# Patient Record
Sex: FEMALE | Race: WHITE | Employment: UNEMPLOYED | URBAN - METROPOLITAN AREA
[De-identification: names, ages, dates, MRNs, and addresses within clinical notes are randomized per-mention and may not be internally consistent; named-entity substitution may affect disease eponyms.]

---

## 2017-12-11 ENCOUNTER — OFFICE VISIT (OUTPATIENT)
Dept: FAMILY MEDICINE CLINIC | Age: 42
End: 2017-12-11
Payer: COMMERCIAL

## 2017-12-11 VITALS
TEMPERATURE: 98.2 F | SYSTOLIC BLOOD PRESSURE: 130 MMHG | DIASTOLIC BLOOD PRESSURE: 89 MMHG | HEART RATE: 81 BPM | BODY MASS INDEX: 28.49 KG/M2 | HEIGHT: 65 IN | RESPIRATION RATE: 20 BRPM | WEIGHT: 171 LBS

## 2017-12-11 DIAGNOSIS — Z11.4 SCREENING FOR HIV (HUMAN IMMUNODEFICIENCY VIRUS): ICD-10-CM

## 2017-12-11 DIAGNOSIS — Z76.89 ENCOUNTER TO ESTABLISH CARE: ICD-10-CM

## 2017-12-11 DIAGNOSIS — K64.9 HEMORRHOIDS, UNSPECIFIED HEMORRHOID TYPE: ICD-10-CM

## 2017-12-11 DIAGNOSIS — Z23 NEED FOR DIPHTHERIA-TETANUS-PERTUSSIS (TDAP) VACCINE: ICD-10-CM

## 2017-12-11 DIAGNOSIS — E11.9 CONTROLLED TYPE 2 DIABETES MELLITUS WITHOUT COMPLICATION, WITHOUT LONG-TERM CURRENT USE OF INSULIN (HCC): Primary | ICD-10-CM

## 2017-12-11 PROCEDURE — 99204 OFFICE O/P NEW MOD 45 MIN: CPT | Performed by: NURSE PRACTITIONER

## 2017-12-11 PROCEDURE — 96127 BRIEF EMOTIONAL/BEHAV ASSMT: CPT | Performed by: NURSE PRACTITIONER

## 2017-12-11 PROCEDURE — 90471 IMMUNIZATION ADMIN: CPT | Performed by: NURSE PRACTITIONER

## 2017-12-11 PROCEDURE — 90715 TDAP VACCINE 7 YRS/> IM: CPT | Performed by: NURSE PRACTITIONER

## 2017-12-11 RX ORDER — CHOLECALCIFEROL (VITAMIN D3) 125 MCG
CAPSULE ORAL DAILY
COMMUNITY
End: 2022-06-22

## 2017-12-11 ASSESSMENT — PATIENT HEALTH QUESTIONNAIRE - PHQ9
SUM OF ALL RESPONSES TO PHQ QUESTIONS 1-9: 3
8. MOVING OR SPEAKING SO SLOWLY THAT OTHER PEOPLE COULD HAVE NOTICED. OR THE OPPOSITE, BEING SO FIGETY OR RESTLESS THAT YOU HAVE BEEN MOVING AROUND A LOT MORE THAN USUAL: 0
10. IF YOU CHECKED OFF ANY PROBLEMS, HOW DIFFICULT HAVE THESE PROBLEMS MADE IT FOR YOU TO DO YOUR WORK, TAKE CARE OF THINGS AT HOME, OR GET ALONG WITH OTHER PEOPLE: 0
1. LITTLE INTEREST OR PLEASURE IN DOING THINGS: 3
4. FEELING TIRED OR HAVING LITTLE ENERGY: 0
5. POOR APPETITE OR OVEREATING: 0
3. TROUBLE FALLING OR STAYING ASLEEP: 0
2. FEELING DOWN, DEPRESSED OR HOPELESS: 0
7. TROUBLE CONCENTRATING ON THINGS, SUCH AS READING THE NEWSPAPER OR WATCHING TELEVISION: 0
9. THOUGHTS THAT YOU WOULD BE BETTER OFF DEAD, OR OF HURTING YOURSELF: 0
6. FEELING BAD ABOUT YOURSELF - OR THAT YOU ARE A FAILURE OR HAVE LET YOURSELF OR YOUR FAMILY DOWN: 0
SUM OF ALL RESPONSES TO PHQ9 QUESTIONS 1 & 2: 3

## 2017-12-11 ASSESSMENT — ENCOUNTER SYMPTOMS
SHORTNESS OF BREATH: 0
TROUBLE SWALLOWING: 0
CONSTIPATION: 0
VOMITING: 0
SORE THROAT: 0
WHEEZING: 0
RHINORRHEA: 0
COUGH: 0
ABDOMINAL PAIN: 1
DIARRHEA: 0
EYE DISCHARGE: 0
EYE PAIN: 0
NAUSEA: 0

## 2018-03-26 LAB
ALBUMIN SERPL-MCNC: 4.3 G/DL
ALP BLD-CCNC: 62 U/L
ALT SERPL-CCNC: 12 U/L
ANION GAP SERPL CALCULATED.3IONS-SCNC: 15 MMOL/L
AST SERPL-CCNC: 14 U/L
AVERAGE GLUCOSE: 140
BASOPHILS ABSOLUTE: NORMAL /ΜL
BASOPHILS RELATIVE PERCENT: NORMAL %
BILIRUB SERPL-MCNC: 0.5 MG/DL (ref 0.1–1.4)
BUN BLDV-MCNC: 12 MG/DL
CALCIUM SERPL-MCNC: 9.4 MG/DL
CHLORIDE BLD-SCNC: 106 MMOL/L
CHOLESTEROL, TOTAL: 189 MG/DL
CHOLESTEROL/HDL RATIO: 3.94
CO2: 21 MMOL/L
CREAT SERPL-MCNC: 0.77 MG/DL
CREATININE URINE: NORMAL MG/DL
EOSINOPHILS ABSOLUTE: NORMAL /ΜL
EOSINOPHILS RELATIVE PERCENT: NORMAL %
GFR CALCULATED: >60
GLUCOSE BLD-MCNC: 126 MG/DL
HBA1C MFR BLD: 6.5 %
HCT VFR BLD CALC: 37.2 % (ref 36–46)
HDLC SERPL-MCNC: 48 MG/DL (ref 35–70)
HEMOGLOBIN: 12.2 G/DL (ref 12–16)
HIV AG/AB: NONREACTIVE
LDL CHOLESTEROL CALCULATED: 109.2 MG/DL (ref 0–160)
LYMPHOCYTES ABSOLUTE: NORMAL /ΜL
LYMPHOCYTES RELATIVE PERCENT: NORMAL %
MCH RBC QN AUTO: 25.1 PG
MCHC RBC AUTO-ENTMCNC: 32.8 G/DL
MCV RBC AUTO: 76.5 FL
MICROALBUMIN/CREAT 24H UR: 9.2 MG/G{CREAT}
MONOCYTES ABSOLUTE: NORMAL /ΜL
MONOCYTES RELATIVE PERCENT: NORMAL %
NEUTROPHILS ABSOLUTE: NORMAL /ΜL
NEUTROPHILS RELATIVE PERCENT: NORMAL %
PLATELET # BLD: 185 K/ΜL
PMV BLD AUTO: 9.3 FL
POTASSIUM SERPL-SCNC: 4 MMOL/L
RBC # BLD: 4.86 10^6/ΜL
SODIUM BLD-SCNC: 138 MMOL/L
TOTAL PROTEIN: 7.5
TRIGL SERPL-MCNC: 159 MG/DL
TSH SERPL DL<=0.05 MIU/L-ACNC: 2.06 UIU/ML
VLDLC SERPL CALC-MCNC: NORMAL MG/DL
WBC # BLD: 7.7 10^3/ML

## 2018-03-28 DIAGNOSIS — E11.9 CONTROLLED TYPE 2 DIABETES MELLITUS WITHOUT COMPLICATION, WITHOUT LONG-TERM CURRENT USE OF INSULIN (HCC): ICD-10-CM

## 2018-03-28 DIAGNOSIS — Z11.4 SCREENING FOR HIV (HUMAN IMMUNODEFICIENCY VIRUS): ICD-10-CM

## 2018-04-09 DIAGNOSIS — E11.9 CONTROLLED TYPE 2 DIABETES MELLITUS WITHOUT COMPLICATION, WITHOUT LONG-TERM CURRENT USE OF INSULIN (HCC): ICD-10-CM

## 2018-08-27 ENCOUNTER — OFFICE VISIT (OUTPATIENT)
Dept: FAMILY MEDICINE CLINIC | Age: 43
End: 2018-08-27
Payer: COMMERCIAL

## 2018-08-27 VITALS
SYSTOLIC BLOOD PRESSURE: 134 MMHG | HEART RATE: 80 BPM | BODY MASS INDEX: 29.38 KG/M2 | TEMPERATURE: 98.2 F | RESPIRATION RATE: 17 BRPM | WEIGHT: 175.2 LBS | DIASTOLIC BLOOD PRESSURE: 88 MMHG

## 2018-08-27 DIAGNOSIS — M79.81 PAROXYSMAL HEMATOMA OF FINGER: ICD-10-CM

## 2018-08-27 DIAGNOSIS — E78.2 MIXED HYPERLIPIDEMIA: ICD-10-CM

## 2018-08-27 DIAGNOSIS — E11.9 CONTROLLED TYPE 2 DIABETES MELLITUS WITHOUT COMPLICATION, WITHOUT LONG-TERM CURRENT USE OF INSULIN (HCC): Primary | ICD-10-CM

## 2018-08-27 PROCEDURE — 99214 OFFICE O/P EST MOD 30 MIN: CPT | Performed by: NURSE PRACTITIONER

## 2018-08-27 ASSESSMENT — ENCOUNTER SYMPTOMS
EYE REDNESS: 1
EYE ITCHING: 1
VOMITING: 0
COUGH: 0
WHEEZING: 0
EYE PAIN: 0
SHORTNESS OF BREATH: 0
SORE THROAT: 0
DIARRHEA: 0
EYE DISCHARGE: 0
ABDOMINAL PAIN: 0
BACK PAIN: 0
NAUSEA: 0
RHINORRHEA: 1
CONSTIPATION: 0

## 2018-08-27 NOTE — PROGRESS NOTES
PHQ Scores 12/11/2017   PHQ2 Score 3   PHQ9 Score 3     Interpretation of Total Score Depression Severity: 1-4 = Minimal depression, 5-9 = Mild depression, 10-14 = Moderate depression, 15-19 = Moderately severe depression, 20-27 = Severe depression          Electronically signed by CECILIA Chahal CNP on 8/28/2018 at 7:52 AM

## 2018-08-28 PROBLEM — E78.2 MIXED HYPERLIPIDEMIA: Chronic | Status: ACTIVE | Noted: 2018-08-27

## 2018-08-28 NOTE — PATIENT INSTRUCTIONS
good, quick way to make sure that you have a balanced meal. It also helps you spread carbs throughout the day. ¨ Divide your plate by types of foods. Put non-starchy vegetables on half the plate, meat or other protein food on one-quarter of the plate, and a grain or starchy vegetable in the final quarter of the plate. To this you can add a small piece of fruit and 1 cup of milk or yogurt, depending on how many carbs you are supposed to eat at a meal.  · Try to eat about the same amount of carbs at each meal. Do not \"save up\" your daily allowance of carbs to eat at one meal.  · Proteins have very little or no carbs per serving. Examples of proteins are beef, chicken, turkey, fish, eggs, tofu, cheese, cottage cheese, and peanut butter. A serving size of meat is 3 ounces, which is about the size of a deck of cards. Examples of meat substitute serving sizes (equal to 1 ounce of meat) are 1/4 cup of cottage cheese, 1 egg, 1 tablespoon of peanut butter, and ½ cup of tofu. How can you eat out and still eat healthy? · Learn to estimate the serving sizes of foods that have carbohydrate. If you measure food at home, it will be easier to estimate the amount in a serving of restaurant food. · If the meal you order has too much carbohydrate (such as potatoes, corn, or baked beans), ask to have a low-carbohydrate food instead. Ask for a salad or green vegetables. · If you use insulin, check your blood sugar before and after eating out to help you plan how much to eat in the future. · If you eat more carbohydrate at a meal than you had planned, take a walk or do other exercise. This will help lower your blood sugar. What else should you know? · Limit saturated fat, such as the fat from meat and dairy products. This is a healthy choice because people who have diabetes are at higher risk of heart disease. So choose lean cuts of meat and nonfat or low-fat dairy products.  Use olive or canola oil instead of butter or shortening when cooking. · Don't skip meals. Your blood sugar may drop too low if you skip meals and take insulin or certain medicines for diabetes. · Check with your doctor before you drink alcohol. Alcohol can cause your blood sugar to drop too low. Alcohol can also cause a bad reaction if you take certain diabetes medicines. Follow-up care is a key part of your treatment and safety. Be sure to make and go to all appointments, and call your doctor if you are having problems. It's also a good idea to know your test results and keep a list of the medicines you take. Where can you learn more? Go to https://[facility]. org and sign in to your [employer] account. Enter M064 in the Soci[employer] box to learn more about \"Learning About Diabetes Food Guidelines. \"     If you do not have an account, please click on the \"Sign Up Now\" link. Current as of: December 7, 2017  Content Version: 11.7  © 1167-2960 [employer]. Care instructions adapted under license by [facility] ([facility]). If you have questions about a medical condition or this instruction, always ask your healthcare professional. [place] [address] any warranty or liability for your use of this information.

## 2018-10-07 DIAGNOSIS — E11.9 CONTROLLED TYPE 2 DIABETES MELLITUS WITHOUT COMPLICATION, WITHOUT LONG-TERM CURRENT USE OF INSULIN (HCC): ICD-10-CM

## 2018-11-09 LAB
ALBUMIN SERPL-MCNC: 4.4 G/DL
ALP BLD-CCNC: 50 U/L
ALT SERPL-CCNC: 9 U/L
ANION GAP SERPL CALCULATED.3IONS-SCNC: NORMAL MMOL/L
AST SERPL-CCNC: 14 U/L
AVERAGE GLUCOSE: 143
BASOPHILS ABSOLUTE: ABNORMAL /ΜL
BASOPHILS RELATIVE PERCENT: ABNORMAL %
BILIRUB SERPL-MCNC: 0.4 MG/DL (ref 0.1–1.4)
BUN BLDV-MCNC: 11 MG/DL
CALCIUM SERPL-MCNC: 9.4 MG/DL
CHLORIDE BLD-SCNC: 106 MMOL/L
CHOLESTEROL, TOTAL: 197 MG/DL
CHOLESTEROL/HDL RATIO: 5.18
CO2: 24 MMOL/L
CREAT SERPL-MCNC: 0.77 MG/DL
EOSINOPHILS ABSOLUTE: ABNORMAL /ΜL
EOSINOPHILS RELATIVE PERCENT: ABNORMAL %
FERRITIN: 5.4 NG/ML (ref 9–150)
GFR CALCULATED: >60
GLUCOSE BLD-MCNC: 116 MG/DL
HBA1C MFR BLD: 6.6 %
HCT VFR BLD CALC: 34.8 % (ref 36–46)
HDLC SERPL-MCNC: 38 MG/DL (ref 35–70)
HEMOGLOBIN: 11.4 G/DL (ref 12–16)
IRON: 46
LDL CHOLESTEROL CALCULATED: 119.2 MG/DL (ref 0–160)
LYMPHOCYTES ABSOLUTE: ABNORMAL /ΜL
LYMPHOCYTES RELATIVE PERCENT: ABNORMAL %
MCH RBC QN AUTO: 24.1 PG
MCHC RBC AUTO-ENTMCNC: 32.8 G/DL
MCV RBC AUTO: 73.6 FL
MONOCYTES ABSOLUTE: ABNORMAL /ΜL
MONOCYTES RELATIVE PERCENT: ABNORMAL %
NEUTROPHILS ABSOLUTE: ABNORMAL /ΜL
NEUTROPHILS RELATIVE PERCENT: ABNORMAL %
PLATELET # BLD: 216 K/ΜL
PMV BLD AUTO: 8.7 FL
POTASSIUM SERPL-SCNC: 4 MMOL/L
RBC # BLD: 4.73 10^6/ΜL
SODIUM BLD-SCNC: 138 MMOL/L
TOTAL IRON BINDING CAPACITY: 463
TOTAL PROTEIN: 7.2
TRIGL SERPL-MCNC: 199 MG/DL
VLDLC SERPL CALC-MCNC: NORMAL MG/DL
WBC # BLD: 6.7 10^3/ML

## 2018-11-13 DIAGNOSIS — E11.9 CONTROLLED TYPE 2 DIABETES MELLITUS WITHOUT COMPLICATION, WITHOUT LONG-TERM CURRENT USE OF INSULIN (HCC): ICD-10-CM

## 2018-11-13 DIAGNOSIS — M79.81 PAROXYSMAL HEMATOMA OF FINGER: ICD-10-CM

## 2018-11-13 DIAGNOSIS — E78.2 MIXED HYPERLIPIDEMIA: ICD-10-CM

## 2018-11-14 DIAGNOSIS — E61.1 IRON DEFICIENCY: ICD-10-CM

## 2018-11-14 DIAGNOSIS — E78.2 MIXED HYPERLIPIDEMIA: Primary | ICD-10-CM

## 2018-11-14 DIAGNOSIS — R79.89 ABNORMAL CBC: ICD-10-CM

## 2018-11-14 RX ORDER — FERROUS SULFATE 325(65) MG
325 TABLET ORAL
Qty: 30 TABLET | Refills: 3 | Status: SHIPPED | OUTPATIENT
Start: 2018-11-14 | End: 2021-04-21

## 2018-11-14 RX ORDER — ATORVASTATIN CALCIUM 10 MG/1
10 TABLET, FILM COATED ORAL DAILY
Qty: 30 TABLET | Refills: 3 | Status: SHIPPED | OUTPATIENT
Start: 2018-11-14 | End: 2019-03-20 | Stop reason: SDUPTHER

## 2019-03-22 DIAGNOSIS — R79.89 ABNORMAL CBC: Primary | ICD-10-CM

## 2019-03-22 LAB
ABSOLUTE BASO #: 0 K/UL (ref 0–0.1)
ABSOLUTE EOS #: 0.1 K/UL (ref 0.1–0.4)
ABSOLUTE LYMPH #: 1.7 K/UL (ref 0.8–5.2)
ABSOLUTE MONO #: 0.4 K/UL (ref 0.1–0.9)
ABSOLUTE NEUT #: 4.4 K/UL (ref 1.3–9.1)
ALT SERPL-CCNC: 11 U/L (ref 5–40)
AST SERPL-CCNC: 13 U/L (ref 9–40)
BASOPHILS RELATIVE PERCENT: 0.3 %
CHOLESTEROL/HDL RATIO: 4.3
CHOLESTEROL: 157 MG/DL
EOSINOPHILS RELATIVE PERCENT: 2 %
FERRITIN: 11.67 NG/ML (ref 13–200)
HCT VFR BLD CALC: 36.3 % (ref 36–48)
HDLC SERPL-MCNC: 36.1 MG/DL
HEMOGLOBIN: 12.2 G/DL (ref 12–16)
IRON SATURATION: 58 % (ref 20–50)
IRON, SERUM: 227 UG/DL (ref 37–145)
LDL CHOLESTEROL CALCULATED: 78 MG/DL
LDL/HDL RATIO: 2.2
LYMPHOCYTE %: 25.5 %
MCH RBC QN AUTO: 25.8 PG (ref 27–34)
MCHC RBC AUTO-ENTMCNC: 33.6 G/DL (ref 31–36)
MCV RBC AUTO: 76.9 FL (ref 80–100)
MONOCYTES # BLD: 6.2 %
NEUTROPHILS RELATIVE PERCENT: 65.5 %
PDW BLD-RTO: 14.8 % (ref 10.8–14.8)
PLATELETS: 183 K/UL (ref 150–450)
RBC: 4.72 M/UL (ref 4–5.5)
TOTAL IRON BINDING CAPACITY: 389 MCG/DL (ref 250–450)
TRIGL SERPL-MCNC: 216 MG/DL
UNSATURATED IRON BINDING CAPACITY: 162 UG/DL (ref 112–347)
VLDLC SERPL CALC-MCNC: 43 MG/DL
WBC: 6.7 K/UL (ref 3.7–10.8)

## 2019-03-22 NOTE — PROGRESS NOTES
Orders in chart. I just extended previous future orders that were not done. Please print if she needs a copy of the order.

## 2019-04-04 DIAGNOSIS — E11.9 CONTROLLED TYPE 2 DIABETES MELLITUS WITHOUT COMPLICATION, WITHOUT LONG-TERM CURRENT USE OF INSULIN (HCC): ICD-10-CM

## 2019-04-04 NOTE — TELEPHONE ENCOUNTER
LRF 10/8/2018 #30 RF5  LOV 8/27/2018  RTO 6 Months    Health Maintenance   Topic Date Due    Pneumococcal 0-64 years at Risk Vaccine (1 of 1 - PPSV23) 01/08/1982    Diabetic retinal exam  01/08/1986    Hepatitis B Vaccine (1 of 3 - Risk 3-dose series) 01/08/1995    Diabetic microalbuminuria test  03/26/2019    Diabetic foot exam  08/27/2019    Flu vaccine (Season Ended) 09/01/2019    A1C test (Diabetic or Prediabetic)  11/09/2019    Lipid screen  03/21/2020    Cervical cancer screen  12/20/2022    DTaP/Tdap/Td vaccine (2 - Td) 12/11/2027    HIV screen  Completed             (applicable per patient's age: Cancer Screenings, Depression Screening, Fall Risk Screening, Immunizations)    Hemoglobin A1C (%)   Date Value   11/09/2018 6.6   03/26/2018 6.5     LDL Calculated (mg/dL)   Date Value   03/21/2019 78     AST (U/L)   Date Value   03/21/2019 13     ALT (U/L)   Date Value   03/21/2019 11     BUN (mg/dL)   Date Value   11/09/2018 11      (goal A1C is < 7)   (goal LDL is <100) need 30-50% reduction from baseline     BP Readings from Last 3 Encounters:   08/27/18 134/88   12/11/17 130/89   08/01/14 150/88    (goal /80)      All Future Testing planned in [facility]:  Lab Frequency Next Occurrence   Protime-INR Once 08/28/2018   CBC Once 06/17/2019   Iron And TIBC Once 06/17/2019   Ferritin Once 06/17/2019       Next Visit Date:  Future Appointments   Date Time Provider Mehul Mccarthy   4/18/2019 11:00 AM [address], APRN - CNP [facility]            Patient Active Problem List:     DM II (diabetes mellitus, type II), controlled ([facility] [address]     Hemorrhoids     Paroxysmal hematoma of finger     Mixed hyperlipidemia

## 2019-07-29 ENCOUNTER — HOSPITAL ENCOUNTER (OUTPATIENT)
Age: 44
Setting detail: SPECIMEN
Discharge: HOME | End: 2019-07-29
Payer: COMMERCIAL

## 2019-07-29 ENCOUNTER — OFFICE VISIT (OUTPATIENT)
Dept: FAMILY MEDICINE CLINIC | Age: 44
End: 2019-07-29
Payer: COMMERCIAL

## 2019-07-29 VITALS
TEMPERATURE: 98.8 F | HEIGHT: 65 IN | HEART RATE: 82 BPM | BODY MASS INDEX: 29.66 KG/M2 | SYSTOLIC BLOOD PRESSURE: 132 MMHG | WEIGHT: 178 LBS | RESPIRATION RATE: 18 BRPM | DIASTOLIC BLOOD PRESSURE: 86 MMHG

## 2019-07-29 DIAGNOSIS — E11.9 CONTROLLED TYPE 2 DIABETES MELLITUS WITHOUT COMPLICATION, WITHOUT LONG-TERM CURRENT USE OF INSULIN (HCC): Primary | ICD-10-CM

## 2019-07-29 DIAGNOSIS — D50.9 IRON DEFICIENCY ANEMIA, UNSPECIFIED IRON DEFICIENCY ANEMIA TYPE: ICD-10-CM

## 2019-07-29 DIAGNOSIS — Z12.31 SCREENING MAMMOGRAM, ENCOUNTER FOR: ICD-10-CM

## 2019-07-29 DIAGNOSIS — E78.2 MIXED HYPERLIPIDEMIA: ICD-10-CM

## 2019-07-29 DIAGNOSIS — E11.9 CONTROLLED TYPE 2 DIABETES MELLITUS WITHOUT COMPLICATION, WITHOUT LONG-TERM CURRENT USE OF INSULIN (HCC): ICD-10-CM

## 2019-07-29 LAB
CREATININE URINE: 111.4 MG/DL (ref 28–217)
MICROALBUMIN/CREAT 24H UR: 50 MG/L
MICROALBUMIN/CREAT UR-RTO: 45 MCG/MG CREAT

## 2019-07-29 PROCEDURE — 99214 OFFICE O/P EST MOD 30 MIN: CPT | Performed by: NURSE PRACTITIONER

## 2019-07-29 ASSESSMENT — ENCOUNTER SYMPTOMS
RHINORRHEA: 0
COUGH: 0
VOMITING: 0
CONSTIPATION: 0
EYE PAIN: 0
EYE DISCHARGE: 0
BACK PAIN: 0
SHORTNESS OF BREATH: 0
COLOR CHANGE: 1
SORE THROAT: 0
BLOOD IN STOOL: 0
DIARRHEA: 0
ABDOMINAL PAIN: 0

## 2019-07-29 ASSESSMENT — PATIENT HEALTH QUESTIONNAIRE - PHQ9
SUM OF ALL RESPONSES TO PHQ9 QUESTIONS 1 & 2: 0
2. FEELING DOWN, DEPRESSED OR HOPELESS: 0
SUM OF ALL RESPONSES TO PHQ QUESTIONS 1-9: 0
SUM OF ALL RESPONSES TO PHQ QUESTIONS 1-9: 0
1. LITTLE INTEREST OR PLEASURE IN DOING THINGS: 0

## 2019-08-15 ENCOUNTER — TELEPHONE (OUTPATIENT)
Dept: FAMILY MEDICINE CLINIC | Age: 44
End: 2019-08-15

## 2019-08-15 DIAGNOSIS — E78.2 MIXED HYPERLIPIDEMIA: Primary | ICD-10-CM

## 2019-08-15 DIAGNOSIS — R79.89 ABNORMAL CBC: ICD-10-CM

## 2019-08-15 DIAGNOSIS — D50.9 IRON DEFICIENCY ANEMIA, UNSPECIFIED IRON DEFICIENCY ANEMIA TYPE: ICD-10-CM

## 2019-08-15 LAB
ABSOLUTE BASO #: 0 X10E9/L (ref 0–0.9)
ABSOLUTE EOS #: 0.1 X10E9/L (ref 0–0.4)
ABSOLUTE LYMPH #: 1.5 X10E9/L (ref 1–3.5)
ABSOLUTE MONO #: 0.3 X10E9/L (ref 0–0.9)
ABSOLUTE NEUT #: 4.2 X10E9/L (ref 1.5–6.6)
ALBUMIN SERPL-MCNC: 4.3 G/DL (ref 3.2–5.3)
ALK PHOSPHATASE: 61 U/L (ref 39–130)
ALT SERPL-CCNC: 11 U/L (ref 0–31)
ANION GAP SERPL CALCULATED.3IONS-SCNC: 10 MMOL/L (ref 4–12)
AST SERPL-CCNC: 15 U/L (ref 0–41)
AVERAGE GLUCOSE: 148 MG/DL
BASOPHILS RELATIVE PERCENT: 0.6 %
BILIRUB SERPL-MCNC: 0.7 MG/DL (ref 0.3–1.2)
BUN BLDV-MCNC: 12 MG/DL (ref 5–23)
CALCIUM SERPL-MCNC: 9.4 MG/DL (ref 8.5–10.5)
CHLORIDE BLD-SCNC: 105 MMOL/L (ref 98–109)
CHOLESTEROL/HDL RATIO: 4.9 (ref 1–5)
CHOLESTEROL: 186 MG/DL (ref 150–200)
CO2: 26 MMOL/L (ref 22–32)
CREAT SERPL-MCNC: 0.74 MG/DL (ref 0.4–1)
EGFR AFRICAN AMERICAN: >60 ML/MIN/1.73SQ.M
EGFR IF NONAFRICAN AMERICAN: >60 ML/MIN/1.73SQ.M
EOSINOPHILS RELATIVE PERCENT: 1.8 %
FERRITIN: 6 NG/ML (ref 11–307)
GLUCOSE: 118 MG/DL (ref 65–99)
HBA1C MFR BLD: 6.8 % (ref 4.4–6.4)
HCT VFR BLD CALC: 36.8 % (ref 35–47)
HDLC SERPL-MCNC: 38 MG/DL
HEMOGLOBIN: 12.4 G/DL (ref 11.7–15.5)
IRON SATURATION: 54 % SATURATION (ref 15–50)
IRON, SERUM: 246 UG/DL (ref 50–170)
LDL CHOLESTEROL CALCULATED: 92 MG/DL
LDL/HDL RATIO: 2.4
LYMPHOCYTE %: 24.7 %
MCH RBC QN AUTO: 25.7 PG (ref 26–33.5)
MCHC RBC AUTO-ENTMCNC: 33.7 G/DL (ref 32–37)
MCV RBC AUTO: 76 FL (ref 81–100)
MONOCYTES # BLD: 5.6 %
NEUTROPHILS RELATIVE PERCENT: 67.3 %
PDW BLD-RTO: 15.5 % (ref 11.5–14.7)
PLATELETS: 183 X10E9/L (ref 150–450)
PMV BLD AUTO: 8.7 FL (ref 7–12)
POTASSIUM SERPL-SCNC: 4 MMOL/L (ref 3.5–5)
RBC: 4.82 X10E12/L (ref 3.8–5.2)
SODIUM BLD-SCNC: 141 MMOL/L (ref 134–146)
TOTAL IRON BINDING CAPACITY: 458 UG/DL (ref 250–425)
TOTAL PROTEIN: 7.1 G/DL (ref 6–8)
TRIGL SERPL-MCNC: 278 MG/DL (ref 27–150)
VLDLC SERPL CALC-MCNC: 56 MG/DL (ref 0–30)
WBC: 6.2 X10E9/L (ref 4.8–10.8)

## 2019-10-02 DIAGNOSIS — E11.9 CONTROLLED TYPE 2 DIABETES MELLITUS WITHOUT COMPLICATION, WITHOUT LONG-TERM CURRENT USE OF INSULIN (HCC): ICD-10-CM

## 2019-10-25 DIAGNOSIS — E78.2 MIXED HYPERLIPIDEMIA: ICD-10-CM

## 2019-10-27 RX ORDER — ATORVASTATIN CALCIUM 10 MG/1
10 TABLET, FILM COATED ORAL DAILY
Qty: 90 TABLET | Refills: 0 | Status: SHIPPED | OUTPATIENT
Start: 2019-10-27 | End: 2020-02-27

## 2020-02-27 ENCOUNTER — OFFICE VISIT (OUTPATIENT)
Dept: PRIMARY CARE CLINIC | Age: 45
End: 2020-02-27
Payer: COMMERCIAL

## 2020-02-27 VITALS
OXYGEN SATURATION: 99 % | SYSTOLIC BLOOD PRESSURE: 144 MMHG | BODY MASS INDEX: 30.69 KG/M2 | DIASTOLIC BLOOD PRESSURE: 88 MMHG | TEMPERATURE: 102.1 F | WEIGHT: 183 LBS

## 2020-02-27 LAB
INFLUENZA A ANTIBODY: NORMAL
INFLUENZA B ANTIBODY: NORMAL

## 2020-02-27 PROCEDURE — 87804 INFLUENZA ASSAY W/OPTIC: CPT | Performed by: PHYSICIAN ASSISTANT

## 2020-02-27 PROCEDURE — 99213 OFFICE O/P EST LOW 20 MIN: CPT | Performed by: PHYSICIAN ASSISTANT

## 2020-02-27 RX ORDER — OSELTAMIVIR PHOSPHATE 75 MG/1
75 CAPSULE ORAL 2 TIMES DAILY
Qty: 10 CAPSULE | Refills: 0 | Status: SHIPPED | OUTPATIENT
Start: 2020-02-27 | End: 2020-03-03

## 2020-02-27 RX ORDER — BENZONATATE 200 MG/1
200 CAPSULE ORAL 3 TIMES DAILY PRN
Qty: 30 CAPSULE | Refills: 0 | Status: SHIPPED | OUTPATIENT
Start: 2020-02-27 | End: 2020-03-05

## 2020-02-27 ASSESSMENT — ENCOUNTER SYMPTOMS
DIARRHEA: 0
SORE THROAT: 0
WHEEZING: 0
RHINORRHEA: 0
NAUSEA: 0
COUGH: 1
VOMITING: 0
SHORTNESS OF BREATH: 0

## 2020-02-27 ASSESSMENT — PATIENT HEALTH QUESTIONNAIRE - PHQ9
SUM OF ALL RESPONSES TO PHQ QUESTIONS 1-9: 0
SUM OF ALL RESPONSES TO PHQ QUESTIONS 1-9: 0
1. LITTLE INTEREST OR PLEASURE IN DOING THINGS: 0
SUM OF ALL RESPONSES TO PHQ9 QUESTIONS 1 & 2: 0
2. FEELING DOWN, DEPRESSED OR HOPELESS: 0

## 2020-02-27 NOTE — PROGRESS NOTES
Mood and Affect: Mood normal.         Behavior: Behavior normal.         Thought Content: Thought content normal.         Judgment: Judgment normal.       Results for orders placed or performed in visit on 02/27/20   POCT Influenza A/B   Result Value Ref Range    Influenza A Ab neg     Influenza B Ab neg          Assessment & Plan:     1. Fever, unspecified fever cause    - POCT Influenza A/B    2. Influenza-like illness    - oseltamivir (TAMIFLU) 75 MG capsule; Take 1 capsule by mouth 2 times daily for 5 days  Dispense: 10 capsule; Refill: 0  - benzonatate (TESSALON) 200 MG capsule; Take 1 capsule by mouth 3 times daily as needed for Cough  Dispense: 30 capsule; Refill: 0    3. Cough    - benzonatate (TESSALON) 200 MG capsule; Take 1 capsule by mouth 3 times daily as needed for Cough  Dispense: 30 capsule;  Refill: 0    Fluids nsaids rest  Follow up not improving or worsens  Answered her questions    Beverly Mao  2/27/2020 3:23 PM    (Pleasenote that portions of this note were completed with a voice recognition program.Efforts were made to edit the dictations but occasionally words are mis-transcribed.)

## 2020-04-03 NOTE — TELEPHONE ENCOUNTER
Lov: 7/29/19  Lrf: 10/4/19  Na: 0   Health Maintenance   Topic Date Due    Diabetic retinal exam  01/08/1986    Diabetic foot exam  08/27/2019    Hepatitis B vaccine (1 of 3 - Risk 3-dose series) 07/29/2020 (Originally 1/8/1995)    Pneumococcal 0-64 years Vaccine (1 of 1 - PPSV23) 08/13/2020 (Originally 1/8/1982)    Diabetic microalbuminuria test  07/29/2020    A1C test (Diabetic or Prediabetic)  08/14/2020    Lipid screen  08/14/2020    Flu vaccine (Season Ended) 09/01/2020    Cervical cancer screen  12/20/2022    DTaP/Tdap/Td vaccine (2 - Td) 12/11/2027    HIV screen  Completed    Hepatitis A vaccine  Aged Out    Hib vaccine  Aged Out    Meningococcal (ACWY) vaccine  Aged Out             (applicable per patient's age: Cancer Screenings, Depression Screening, Fall Risk Screening, Immunizations)    Hemoglobin A1C (%)   Date Value   08/14/2019 6.8 (H)   11/09/2018 6.6   03/26/2018 6.5     Microalb/Crt. Ratio (mcg/mg creat)   Date Value   07/29/2019 45 (H)     LDL Calculated (mg/dL)   Date Value   08/14/2019 92     AST (U/L)   Date Value   08/14/2019 15     ALT (U/L)   Date Value   08/14/2019 11     BUN (mg/dL)   Date Value   08/14/2019 12      (goal A1C is < 7)   (goal LDL is <100) need 30-50% reduction from baseline     BP Readings from Last 3 Encounters:   02/27/20 (!) 144/88   07/29/19 132/86   08/27/18 134/88    (goal /80)      All Future Testing planned in [facility]:  Lab Frequency Next Occurrence   FLOWER DIGITAL SCREEN W OR WO CAD BILATERAL Once 07/29/2019   Lipid Panel Once 11/15/2019   CBC Once 11/15/2019   Iron And TIBC Once 11/15/2019   Ferritin Once 11/15/2019   AST Once 11/15/2019   ALT Once 11/15/2019       Next Visit Date:  No future appointments.          Patient Active Problem List:     DM II (diabetes mellitus, type II), controlled ([address]     Hemorrhoids     Paroxysmal hematoma of finger     Mixed hyperlipidemia

## 2020-08-06 LAB
ABSOLUTE BASO #: 0 X10E9/L (ref 0–0.2)
ABSOLUTE EOS #: 0.1 X10E9/L (ref 0–0.4)
ABSOLUTE LYMPH #: 1.4 X10E9/L (ref 1–3.5)
ABSOLUTE MONO #: 0.4 X10E9/L (ref 0–0.9)
ABSOLUTE NEUT #: 4.5 X10E9/L (ref 1.5–6.6)
ALT SERPL-CCNC: 11 U/L (ref 0–31)
AST SERPL-CCNC: 16 U/L (ref 0–41)
BASOPHILS RELATIVE PERCENT: 0.5 %
CHOLESTEROL/HDL RATIO: 5.3 (ref 1–5)
CHOLESTEROL: 196 MG/DL (ref 150–200)
EOSINOPHILS RELATIVE PERCENT: 1.8 %
FERRITIN: 26 NG/ML (ref 11–307)
HCT VFR BLD CALC: 41.2 % (ref 35–47)
HDLC SERPL-MCNC: 37 MG/DL
HEMOGLOBIN: 13.9 G/DL (ref 11.7–15.5)
IRON SATURATION: 24 % SATURATION (ref 15–50)
IRON, SERUM: 105 UG/DL (ref 50–170)
LDL CHOLESTEROL CALCULATED: 108 MG/DL
LDL/HDL RATIO: 2.9
LYMPHOCYTE %: 21.5 %
MCH RBC QN AUTO: 28.5 PG (ref 27–34)
MCHC RBC AUTO-ENTMCNC: 33.8 G/DL (ref 32–36)
MCV RBC AUTO: 85 FL (ref 80–100)
MONOCYTES # BLD: 6.1 %
NEUTROPHILS RELATIVE PERCENT: 70.1 %
PDW BLD-RTO: 14.8 % (ref 11.5–15)
PLATELETS: 176 X10E9/L (ref 150–450)
PMV BLD AUTO: 8.6 FL (ref 7–12)
RBC: 4.87 X10E12/L (ref 3.8–5.2)
TOTAL IRON BINDING CAPACITY: 430 UG/DL (ref 250–425)
TRIGL SERPL-MCNC: 257 MG/DL (ref 27–150)
VLDLC SERPL CALC-MCNC: 51 MG/DL (ref 0–30)
WBC: 6.4 X10E9/L (ref 4–11)

## 2020-10-09 ENCOUNTER — TELEPHONE (OUTPATIENT)
Dept: FAMILY MEDICINE CLINIC | Age: 45
End: 2020-10-09

## 2020-10-09 ENCOUNTER — NURSE TRIAGE (OUTPATIENT)
Dept: OTHER | Age: 45
End: 2020-10-09

## 2020-10-27 ENCOUNTER — OFFICE VISIT (OUTPATIENT)
Dept: FAMILY MEDICINE CLINIC | Age: 45
End: 2020-10-27
Payer: COMMERCIAL

## 2020-10-27 VITALS
WEIGHT: 182 LBS | OXYGEN SATURATION: 98 % | SYSTOLIC BLOOD PRESSURE: 169 MMHG | DIASTOLIC BLOOD PRESSURE: 98 MMHG | HEART RATE: 89 BPM | HEIGHT: 64 IN | BODY MASS INDEX: 31.07 KG/M2

## 2020-10-27 LAB
ALBUMIN SERPL-MCNC: 4.6 G/DL
ALP BLD-CCNC: 54 U/L
ALT SERPL-CCNC: 13 U/L
ANION GAP SERPL CALCULATED.3IONS-SCNC: NORMAL MMOL/L
AST SERPL-CCNC: 20 U/L
AVERAGE GLUCOSE: 151
BILIRUB SERPL-MCNC: 0.5 MG/DL (ref 0.1–1.4)
BUN BLDV-MCNC: 15 MG/DL
CALCIUM SERPL-MCNC: 9.3 MG/DL
CHLORIDE BLD-SCNC: 103 MMOL/L
CO2: 23 MMOL/L
CREAT SERPL-MCNC: 0.66 MG/DL
CREATININE URINE: 90 MG/DL
GFR CALCULATED: >60
GLUCOSE BLD-MCNC: 131 MG/DL
HBA1C MFR BLD: 6.9 %
MICROALBUMIN/CREAT 24H UR: 111.1 MG/G{CREAT}
POTASSIUM SERPL-SCNC: 4.4 MMOL/L
SODIUM BLD-SCNC: 136 MMOL/L
TOTAL PROTEIN: 7.5

## 2020-10-27 PROCEDURE — 99214 OFFICE O/P EST MOD 30 MIN: CPT | Performed by: NURSE PRACTITIONER

## 2020-10-27 PROCEDURE — 90471 IMMUNIZATION ADMIN: CPT | Performed by: NURSE PRACTITIONER

## 2020-10-27 PROCEDURE — 90732 PPSV23 VACC 2 YRS+ SUBQ/IM: CPT | Performed by: NURSE PRACTITIONER

## 2020-10-27 RX ORDER — LOSARTAN POTASSIUM 50 MG/1
50 TABLET ORAL DAILY
Qty: 30 TABLET | Refills: 1 | Status: SHIPPED | OUTPATIENT
Start: 2020-10-27 | End: 2020-12-28

## 2020-10-27 RX ORDER — ATORVASTATIN CALCIUM 10 MG/1
10 TABLET, FILM COATED ORAL DAILY
Qty: 90 TABLET | Refills: 1 | Status: SHIPPED | OUTPATIENT
Start: 2020-10-27 | End: 2021-05-02 | Stop reason: SDUPTHER

## 2020-10-27 ASSESSMENT — ENCOUNTER SYMPTOMS
SORE THROAT: 0
EYE PAIN: 0
WHEEZING: 0
COUGH: 0
RHINORRHEA: 0
EYE DISCHARGE: 0
NAUSEA: 0
ABDOMINAL PAIN: 0
TROUBLE SWALLOWING: 0
VOMITING: 0
DIARRHEA: 0
SHORTNESS OF BREATH: 0
CONSTIPATION: 0
BACK PAIN: 0

## 2020-10-27 NOTE — PROGRESS NOTES
Exam  Vitals signs and nursing note reviewed. Constitutional:       General: She is not in acute distress. Appearance: She is well-developed. HENT:      Head: Normocephalic and atraumatic. Right Ear: Tympanic membrane and external ear normal.      Left Ear: Tympanic membrane and external ear normal.      Nose: Nose normal.   Eyes:      General:         Right eye: No discharge. Left eye: No discharge. Neck:      Musculoskeletal: Neck supple. Cardiovascular:      Rate and Rhythm: Normal rate and regular rhythm. Pulses:           Radial pulses are 2+ on the right side and 2+ on the left side. Dorsalis pedis pulses are 2+ on the right side and 2+ on the left side. Posterior tibial pulses are 2+ on the right side and 2+ on the left side. Heart sounds: Normal heart sounds. Pulmonary:      Effort: Pulmonary effort is normal. No respiratory distress. Breath sounds: Normal breath sounds. No wheezing or rales. Abdominal:      General: Bowel sounds are normal. There is no distension. Palpations: Abdomen is soft. Tenderness: There is no abdominal tenderness. Musculoskeletal:      Right lower leg: No edema. Left lower leg: No edema. Comments: No red or swollen joints   Skin:     General: Skin is warm and dry. Findings: Rash present. Neurological:      Mental Status: She is alert and oriented to person, place, and time. Comments: Diabetic Foot Exam    Pulses:  normal,   Edema: negative  Skin: normal    Sensory exam  Monofilament Sensation:  normal  (minimum of 5 random plantar locations tested, avoid callous areas - > 1 area with absence of sensation is + for neuropathy)      Plus one of the following  Pinprick:  Intact  Proprioception:  Intact  Vibration (128 Hz):  N/A     Psychiatric:         Mood and Affect: Mood normal.         Behavior: Behavior normal.         Assessment / Plan:     1.  Controlled type 2 diabetes mellitus without complication, without long-term current use of insulin ([facility] [address]    Check labs today including A1C to check sugar levels. Continue medication as prescribed. Recommend healthy diet and regular exercise. - PNEUMOVAX 23 subcutaneous/IM (Pneumococcal polysaccharide vaccine 23-valent >= 1yo)  -  DIABETES FOOT EXAM    2. Mixed hyperlipidemia    Reviewed August lab results and discussed in office. Restart daily statin. - atorvastatin (LIPITOR) 10 MG tablet; Take 1 tablet by mouth daily  Dispense: 90 tablet; Refill: 1    3. Iron deficiency anemia, unspecified iron deficiency anemia type    Reviewed August lab results. Continue iron supplement daily. 4. Dermatitis    Keep area clean and dry. - nystatin-triamcinolone (MYCOLOG II) 164195-7.1 UNIT/GM-% cream; Apply topically 2 times daily. Dispense: 30 g; Refill: 0    5. Essential hypertension    BP high today. Start medication daily as prescribed. Check BP in office in 2 weeks. - losartan (COZAAR) 50 MG tablet; Take 1 tablet by mouth daily  Dispense: 30 tablet; Refill: 1    6. Need for pneumococcal vaccination    - PNEUMOVAX 23 subcutaneous/IM (Pneumococcal polysaccharide vaccine 23-valent >= 1yo)        Refused hepatitis B and flu shots  Call office with concerns      Emily Fierro received counseling on the following healthy behaviors: nutrition, exercise and medication adherence  Reviewed prior labs and health maintenance  Continue current medications, diet and exercise. Discussed use, benefit, and side effects of prescribed medications. Barriers to medication compliance addressed. Patient given educational materials - see patient instructions  Was a self-tracking handout given in paper form or via [employer]? No    Requested Prescriptions     Signed Prescriptions Disp Refills    nystatin-triamcinolone (MYCOLOG II) 475271-1.1 UNIT/GM-% cream 30 g 0     Sig: Apply topically 2 times daily.     atorvastatin (LIPITOR) 10 MG tablet 90 tablet 1     Sig: Take 1 tablet by mouth daily    losartan (COZAAR) 50 MG tablet 30 tablet 1     Sig: Take 1 tablet by mouth daily       All patient questions answered. Patient voiced understanding. Quality Measures    Body mass index is 31.24 kg/m². Elevated. Weight control planned discussed Healthy diet and regular exercise. BP: (!) 169/98 Blood pressure is high. Treatment plan consists of Antihypertensive Medication Started.     Lab Results   Component Value Date    LDLCALC 108 08/05/2020    (goal LDL reduction with dx if diabetes is 50% LDL reduction)      PHQ Scores 2/27/2020 7/29/2019 12/11/2017   PHQ2 Score 0 0 3   PHQ9 Score 0 0 3     Interpretation of Total Score Depression Severity: 1-4 = Minimal depression, 5-9 = Mild depression, 10-14 = Moderate depression, 15-19 = Moderately severe depression, 20-27 = Severe depression      Electronically signed by CECILIA Walters CNP on 10/27/2020 at 2:34 PM

## 2020-10-27 NOTE — PATIENT INSTRUCTIONS

## 2020-11-03 NOTE — TELEPHONE ENCOUNTER
LV 10/27/20  LRF 10/9/20 30 Tablets 0 RF  RTO 11/10/20    Health Maintenance   Topic Date Due    Diabetic retinal exam  01/08/1986    Hepatitis B vaccine (1 of 3 - Risk 3-dose series) 01/08/1995    Flu vaccine (1) 10/27/2021 (Originally 9/1/2020)    Lipid screen  08/05/2021    Diabetic foot exam  10/27/2021    A1C test (Diabetic or Prediabetic)  10/27/2021    Diabetic microalbuminuria test  10/27/2021    Potassium monitoring  10/27/2021    Creatinine monitoring  10/27/2021    Cervical cancer screen  12/20/2022    DTaP/Tdap/Td vaccine (2 - Td) 12/11/2027    Pneumococcal 0-64 years Vaccine  Completed    HIV screen  Completed    Hepatitis A vaccine  Aged Out    Hib vaccine  Aged Out    Meningococcal (ACWY) vaccine  Aged Out             (applicable per patient's age: Cancer Screenings, Depression Screening, Fall Risk Screening, Immunizations)    Hemoglobin A1C (%)   Date Value   10/27/2020 6.9   08/14/2019 6.8 (H)   11/09/2018 6.6     Microalb/Crt.  Ratio (mcg/mg creat)   Date Value   07/29/2019 45 (H)     LDL Calculated (mg/dL)   Date Value   08/05/2020 108     AST (U/L)   Date Value   10/27/2020 20     ALT (U/L)   Date Value   10/27/2020 13     BUN (mg/dL)   Date Value   10/27/2020 15      (goal A1C is < 7)   (goal LDL is <100) need 30-50% reduction from baseline     BP Readings from Last 3 Encounters:   10/27/20 (!) 169/98   02/27/20 (!) 144/88   07/29/19 132/86    (goal /80)      All Future Testing planned in [facility]:  Lab Frequency Next Occurrence   Comprehensive Metabolic Panel Once 07/52/0074   Hemoglobin A1C Once 02/02/2021   Microalbumin, Ur Once 02/02/2021       Next Visit Date:  Future Appointments   Date Time Provider Mehul Mccarthy   11/10/2020 10:00 AM SCHEDULE, [facility]            Patient Active Problem List:     DM II (diabetes mellitus, type II), controlled ([address]     Hemorrhoids     Paroxysmal hematoma of finger     Mixed hyperlipidemia

## 2020-11-10 ENCOUNTER — PATIENT MESSAGE (OUTPATIENT)
Dept: FAMILY MEDICINE CLINIC | Age: 45
End: 2020-11-10

## 2020-11-10 ENCOUNTER — NURSE ONLY (OUTPATIENT)
Dept: FAMILY MEDICINE CLINIC | Age: 45
End: 2020-11-10
Payer: COMMERCIAL

## 2020-11-10 VITALS — DIASTOLIC BLOOD PRESSURE: 92 MMHG | TEMPERATURE: 98.2 F | SYSTOLIC BLOOD PRESSURE: 128 MMHG

## 2020-11-10 PROCEDURE — 99211 OFF/OP EST MAY X REQ PHY/QHP: CPT | Performed by: NURSE PRACTITIONER

## 2020-11-10 NOTE — PROGRESS NOTES
Bp has definitely improved but remains elevated. Continue medication as prescribed. Hard to say if headaches are from medication or if weather/sinus related. Lets continue the medication and monitor symptoms. If headaches persist, will change medication. Recommend follow up in office in 3 months.

## 2020-11-10 NOTE — TELEPHONE ENCOUNTER
Patient called for recommendations, no questions or concerns. Patient states that she will call and make an appointment after she has her A1c done.

## 2020-11-10 NOTE — PROGRESS NOTES
Patient here for a blood pressure check due to starting new medication one week ago. Blood pressure 128/92, patient states having headaches on and off this past week, unsure if they are related to the new medication or not.

## 2020-12-10 NOTE — TELEPHONE ENCOUNTER
Last visit: 11/10/20  Last Med refill: 11/03/20  Does patient have enough medication for 72 hours: no    Next Visit Date:  No future appointments. Health Maintenance   Topic Date Due    Diabetic retinal exam  01/08/1986    Hepatitis B vaccine (1 of 3 - Risk 3-dose series) 01/08/1995    Flu vaccine (1) 10/27/2021 (Originally 9/1/2020)    Lipid screen  08/05/2021    Diabetic foot exam  10/27/2021    A1C test (Diabetic or Prediabetic)  10/27/2021    Diabetic microalbuminuria test  10/27/2021    Potassium monitoring  10/27/2021    Creatinine monitoring  10/27/2021    Cervical cancer screen  12/20/2022    DTaP/Tdap/Td vaccine (2 - Td) 12/11/2027    HIV screen  Completed    Hepatitis A vaccine  Aged Out    Hib vaccine  Aged Out    Meningococcal (ACWY) vaccine  Aged Out    Pneumococcal 0-64 years Vaccine  Aged Out       Hemoglobin A1C (%)   Date Value   10/27/2020 6.9   08/14/2019 6.8 (H)   11/09/2018 6.6             ( goal A1C is < 7)   Microalb/Crt. Ratio (mcg/mg creat)   Date Value   07/29/2019 45 (H)     LDL Calculated (mg/dL)   Date Value   08/05/2020 108   08/14/2019 92       (goal LDL is <100)   AST (U/L)   Date Value   10/27/2020 20     ALT (U/L)   Date Value   10/27/2020 13     BUN (mg/dL)   Date Value   10/27/2020 15     BP Readings from Last 3 Encounters:   11/10/20 (!) 128/92   10/27/20 (!) 169/98   02/27/20 (!) 144/88          (goal 120/80)    All Future Testing planned in [facility]  Lab Frequency Next Occurrence   Comprehensive Metabolic Panel Once 31/27/6555   Hemoglobin A1C Once 02/02/2021   Microalbumin, Ur Once 02/02/2021               Patient Active Problem List:     DM II (diabetes mellitus, type II), controlled ([address]     Hemorrhoids     Paroxysmal hematoma of finger     Mixed hyperlipidemia       pt only recieved 30 pills on her last refill of metformin.

## 2020-12-28 RX ORDER — LOSARTAN POTASSIUM 50 MG/1
50 TABLET ORAL DAILY
Qty: 30 TABLET | Refills: 1 | Status: SHIPPED | OUTPATIENT
Start: 2020-12-28 | End: 2021-03-01

## 2020-12-28 NOTE — TELEPHONE ENCOUNTER
LOV & LRF 10-27-20    Health Maintenance   Topic Date Due    Hepatitis C screen  1975    Diabetic retinal exam  01/08/1986    Hepatitis B vaccine (1 of 3 - Risk 3-dose series) 01/08/1995    Flu vaccine (1) 10/27/2021 (Originally 9/1/2020)    Lipid screen  08/05/2021    Diabetic foot exam  10/27/2021    A1C test (Diabetic or Prediabetic)  10/27/2021    Diabetic microalbuminuria test  10/27/2021    Potassium monitoring  10/27/2021    Creatinine monitoring  10/27/2021    Cervical cancer screen  12/20/2022    DTaP/Tdap/Td vaccine (2 - Td) 12/11/2027    Pneumococcal 0-64 years Vaccine  Completed    HIV screen  Completed    Hepatitis A vaccine  Aged Out    Hib vaccine  Aged Out    Meningococcal (ACWY) vaccine  Aged Out             (applicable per patient's age: Cancer Screenings, Depression Screening, Fall Risk Screening, Immunizations)    Hemoglobin A1C (%)   Date Value   10/27/2020 6.9   08/14/2019 6.8 (H)   11/09/2018 6.6     Microalb/Crt. Ratio (mcg/mg creat)   Date Value   07/29/2019 45 (H)     LDL Calculated (mg/dL)   Date Value   08/05/2020 108     AST (U/L)   Date Value   10/27/2020 20     ALT (U/L)   Date Value   10/27/2020 13     BUN (mg/dL)   Date Value   10/27/2020 15      (goal A1C is < 7)   (goal LDL is <100) need 30-50% reduction from baseline     BP Readings from Last 3 Encounters:   11/10/20 (!) 128/92   10/27/20 (!) 169/98   02/27/20 (!) 144/88    (goal /80)      All Future Testing planned in [facility]:  Lab Frequency Next Occurrence   Comprehensive Metabolic Panel Once 75/87/6541   Hemoglobin A1C Once 02/02/2021   Microalbumin, Ur Once 02/02/2021       Next Visit Date:  No future appointments.          Patient Active Problem List:     DM II (diabetes mellitus, type II), controlled ([address]     Hemorrhoids     Paroxysmal hematoma of finger     Mixed hyperlipidemia

## 2021-03-11 LAB
ALBUMIN SERPL-MCNC: 4.2 G/DL (ref 3.2–5.3)
ALK PHOSPHATASE: 63 U/L (ref 39–130)
ALT SERPL-CCNC: 22 U/L (ref 0–31)
ANION GAP SERPL CALCULATED.3IONS-SCNC: 8 MMOL/L (ref 5–15)
AST SERPL-CCNC: 20 U/L (ref 0–41)
AVERAGE GLUCOSE: 151 MG/DL
BILIRUB SERPL-MCNC: 0.3 MG/DL (ref 0.3–1.2)
BUN BLDV-MCNC: 13 MG/DL (ref 5–23)
CALCIUM SERPL-MCNC: 8.9 MG/DL (ref 8.5–10.5)
CHLORIDE BLD-SCNC: 105 MMOL/L (ref 98–109)
CO2: 26 MMOL/L (ref 22–32)
CREAT SERPL-MCNC: 0.71 MG/DL (ref 0.4–1)
CREATINE, URINE: 113.04 MG/DL
EGFR AFRICAN AMERICAN: >60 ML/MIN/1.73SQ.M
EGFR IF NONAFRICAN AMERICAN: >60 ML/MIN/1.73SQ.M
GLUCOSE: 129 MG/DL (ref 65–99)
HBA1C MFR BLD: 6.9 % (ref 4.4–6.4)
MICROALBUMIN/CREAT 24H UR: 2 MG/DL (ref 0–1.9)
MICROALBUMIN/CREAT UR-RTO: 17.7 MG/G CREAT (ref 0–30)
POTASSIUM SERPL-SCNC: 3.9 MMOL/L (ref 3.5–5)
SODIUM BLD-SCNC: 139 MMOL/L (ref 134–146)
TOTAL PROTEIN: 6.5 G/DL (ref 6–8)

## 2021-04-21 ENCOUNTER — OFFICE VISIT (OUTPATIENT)
Dept: FAMILY MEDICINE CLINIC | Age: 46
End: 2021-04-21
Payer: COMMERCIAL

## 2021-04-21 ENCOUNTER — HOSPITAL ENCOUNTER (OUTPATIENT)
Age: 46
Setting detail: SPECIMEN
Discharge: HOME | End: 2021-04-21

## 2021-04-21 VITALS
WEIGHT: 178 LBS | HEIGHT: 64 IN | OXYGEN SATURATION: 99 % | SYSTOLIC BLOOD PRESSURE: 124 MMHG | DIASTOLIC BLOOD PRESSURE: 82 MMHG | BODY MASS INDEX: 30.39 KG/M2 | HEART RATE: 90 BPM | RESPIRATION RATE: 15 BRPM | TEMPERATURE: 97.9 F

## 2021-04-21 DIAGNOSIS — A09 DIARRHEA, TRAVELERS': ICD-10-CM

## 2021-04-21 DIAGNOSIS — E11.9 CONTROLLED TYPE 2 DIABETES MELLITUS WITHOUT COMPLICATION, WITHOUT LONG-TERM CURRENT USE OF INSULIN (HCC): ICD-10-CM

## 2021-04-21 DIAGNOSIS — I10 ESSENTIAL HYPERTENSION: Primary | ICD-10-CM

## 2021-04-21 DIAGNOSIS — E78.2 MIXED HYPERLIPIDEMIA: ICD-10-CM

## 2021-04-21 DIAGNOSIS — D64.9 CHRONIC ANEMIA: ICD-10-CM

## 2021-04-21 LAB
CHOLESTEROL/HDL RATIO: 2.9
CHOLESTEROL: 93 MG/DL
FERRITIN: 28 UG/L (ref 13–150)
HCT VFR BLD CALC: 37.4 % (ref 36.3–47.1)
HDLC SERPL-MCNC: 32 MG/DL
HEMOGLOBIN: 11.6 G/DL (ref 11.9–15.1)
LDL CHOLESTEROL: 27 MG/DL (ref 0–130)
MCH RBC QN AUTO: 26.1 PG (ref 25.2–33.5)
MCHC RBC AUTO-ENTMCNC: 31 G/DL (ref 28.4–34.8)
MCV RBC AUTO: 84.2 FL (ref 82.6–102.9)
NRBC AUTOMATED: 0 PER 100 WBC
PDW BLD-RTO: 14.3 % (ref 11.8–14.4)
PLATELET # BLD: 220 K/UL (ref 138–453)
PMV BLD AUTO: 10.6 FL (ref 8.1–13.5)
RBC # BLD: 4.44 M/UL (ref 3.95–5.11)
TRIGL SERPL-MCNC: 170 MG/DL
VLDLC SERPL CALC-MCNC: ABNORMAL MG/DL (ref 1–30)
WBC # BLD: 5.8 K/UL (ref 3.5–11.3)

## 2021-04-21 PROCEDURE — 99214 OFFICE O/P EST MOD 30 MIN: CPT | Performed by: STUDENT IN AN ORGANIZED HEALTH CARE EDUCATION/TRAINING PROGRAM

## 2021-04-21 PROCEDURE — 36415 COLL VENOUS BLD VENIPUNCTURE: CPT | Performed by: STUDENT IN AN ORGANIZED HEALTH CARE EDUCATION/TRAINING PROGRAM

## 2021-04-21 RX ORDER — AZITHROMYCIN 500 MG/1
500 TABLET, FILM COATED ORAL DAILY
Qty: 3 TABLET | Refills: 0 | Status: SHIPPED | OUTPATIENT
Start: 2021-04-21 | End: 2021-04-24

## 2021-04-21 SDOH — ECONOMIC STABILITY: FOOD INSECURITY: WITHIN THE PAST 12 MONTHS, YOU WORRIED THAT YOUR FOOD WOULD RUN OUT BEFORE YOU GOT MONEY TO BUY MORE.: NEVER TRUE

## 2021-04-21 SDOH — SOCIAL STABILITY: SOCIAL NETWORK: IN A TYPICAL WEEK, HOW MANY TIMES DO YOU TALK ON THE PHONE WITH FAMILY, FRIENDS, OR NEIGHBORS?: ONCE A WEEK

## 2021-04-21 SDOH — HEALTH STABILITY: PHYSICAL HEALTH: ON AVERAGE, HOW MANY MINUTES DO YOU ENGAGE IN EXERCISE AT THIS LEVEL?: 30 MIN

## 2021-04-21 SDOH — SOCIAL STABILITY: SOCIAL INSECURITY
WITHIN THE LAST YEAR, HAVE YOU BEEN KICKED, HIT, SLAPPED, OR OTHERWISE PHYSICALLY HURT BY YOUR PARTNER OR EX-PARTNER?: NO

## 2021-04-21 SDOH — HEALTH STABILITY: PHYSICAL HEALTH: ON AVERAGE, HOW MANY DAYS PER WEEK DO YOU ENGAGE IN MODERATE TO STRENUOUS EXERCISE (LIKE A BRISK WALK)?: 3 DAYS

## 2021-04-21 SDOH — ECONOMIC STABILITY: TRANSPORTATION INSECURITY
IN THE PAST 12 MONTHS, HAS THE LACK OF TRANSPORTATION KEPT YOU FROM MEDICAL APPOINTMENTS OR FROM GETTING MEDICATIONS?: NO

## 2021-04-21 SDOH — SOCIAL STABILITY: SOCIAL NETWORK
DO YOU BELONG TO ANY CLUBS OR ORGANIZATIONS SUCH AS CHURCH GROUPS UNIONS, FRATERNAL OR ATHLETIC GROUPS, OR SCHOOL GROUPS?: NO

## 2021-04-21 SDOH — SOCIAL STABILITY: SOCIAL NETWORK: ARE YOU MARRIED, WIDOWED, DIVORCED, SEPARATED, NEVER MARRIED, OR LIVING WITH A PARTNER?: MARRIED

## 2021-04-21 SDOH — SOCIAL STABILITY: SOCIAL NETWORK: HOW OFTEN DO YOU GET TOGETHER WITH FRIENDS OR RELATIVES?: ONCE A WEEK

## 2021-04-21 SDOH — ECONOMIC STABILITY: INCOME INSECURITY: HOW HARD IS IT FOR YOU TO PAY FOR THE VERY BASICS LIKE FOOD, HOUSING, MEDICAL CARE, AND HEATING?: NOT HARD AT ALL

## 2021-04-21 SDOH — SOCIAL STABILITY: SOCIAL NETWORK: HOW OFTEN DO YOU ATTEND CHURCH OR RELIGIOUS SERVICES?: NEVER

## 2021-04-21 ASSESSMENT — ENCOUNTER SYMPTOMS
SORE THROAT: 0
ABDOMINAL PAIN: 0
COUGH: 0
WHEEZING: 0
CONSTIPATION: 0
BACK PAIN: 0
ABDOMINAL DISTENTION: 0
CHEST TIGHTNESS: 0
DIARRHEA: 0
SHORTNESS OF BREATH: 0

## 2021-04-21 ASSESSMENT — PATIENT HEALTH QUESTIONNAIRE - PHQ9
SUM OF ALL RESPONSES TO PHQ QUESTIONS 1-9: 0
SUM OF ALL RESPONSES TO PHQ QUESTIONS 1-9: 0
1. LITTLE INTEREST OR PLEASURE IN DOING THINGS: 0
2. FEELING DOWN, DEPRESSED OR HOPELESS: 0

## 2021-04-21 NOTE — PROGRESS NOTES
Natali Singh (:  1975) is a 40 y.o. female,Established patient, here for evaluation of the following chief complaint(s):  Diabetes (New to Provider)      ASSESSMENT/PLAN:  1. Essential hypertension  2. Controlled type 2 diabetes mellitus without complication, without long-term current use of insulin ([facility] [address]  3. Mixed hyperlipidemia  4. Chronic anemia  -     Ferritin; Future  5. Diarrhea, travelers'  -     azithromycin (ZITHROMAX) 500 MG tablet; Take 1 tablet by mouth daily for 3 days, Disp-3 tablet, R-0Normal        No follow-ups on file. SUBJECTIVE/OBJECTIVE:  HPI:     Review of Systems   Constitutional: Negative for chills, fatigue and fever. HENT: Negative for congestion, postnasal drip and sore throat. Eyes: Negative for visual disturbance. Respiratory: Negative for cough, chest tightness, shortness of breath and wheezing. Cardiovascular: Negative. Gastrointestinal: Negative for abdominal distention, abdominal pain, constipation and diarrhea. Genitourinary: Negative for difficulty urinating, dysuria, frequency and urgency. Musculoskeletal: Negative for arthralgias, back pain and joint swelling. Skin: Negative for rash. Neurological: Negative for dizziness, weakness and light-headedness. Psychiatric/Behavioral: Negative for agitation, decreased concentration and sleep disturbance. Physical Exam  Vitals signs and nursing note reviewed. Constitutional:       Appearance: Normal appearance. HENT:      Head: Normocephalic and atraumatic. Eyes:      Extraocular Movements: Extraocular movements intact. Neck:      Musculoskeletal: Normal range of motion and neck supple. Cardiovascular:      Rate and Rhythm: Normal rate and regular rhythm. Pulses: Normal pulses. Heart sounds: Normal heart sounds. Pulmonary:      Effort: Pulmonary effort is normal.      Breath sounds: Normal breath sounds. Abdominal:      General: Abdomen is flat.       Palpations: Abdomen

## 2021-04-26 ENCOUNTER — TELEPHONE (OUTPATIENT)
Dept: ONCOLOGY | Age: 46
End: 2021-04-26

## 2021-04-26 DIAGNOSIS — D50.8 OTHER IRON DEFICIENCY ANEMIA: Primary | ICD-10-CM

## 2021-04-26 NOTE — TELEPHONE ENCOUNTER
Received internal referral from Zoraida Huerta MD for pt to see Dr. Dale Larios for D50.8 (ICD-10-CM) - Other iron deficiency anemia. LM for pt to call back and schedule consult. Referral is in deferred work-q.       Electronically signed by Cherie Canavan on 4/26/2021 at 9:57 AM

## 2021-05-02 DIAGNOSIS — E78.2 MIXED HYPERLIPIDEMIA: ICD-10-CM

## 2021-05-03 RX ORDER — ATORVASTATIN CALCIUM 10 MG/1
10 TABLET, FILM COATED ORAL DAILY
Qty: 90 TABLET | Refills: 1 | Status: SHIPPED | OUTPATIENT
Start: 2021-05-03 | End: 2021-11-17 | Stop reason: SDUPTHER

## 2021-05-03 NOTE — TELEPHONE ENCOUNTER
[facility]:97-    LRF: 10-    Health Maintenance   Topic Date Due    Flu vaccine (Season Ended) 10/27/2021 (Originally 9/1/2021)    COVID-19 Vaccine (1) 10/29/2021 (Originally 1/8/1992)    Diabetic retinal exam  04/21/2022 (Originally 1/8/1986)    Hepatitis B vaccine (1 of 3 - Risk 3-dose series) 04/21/2022 (Originally 1/8/1995)    Hepatitis C screen  04/21/2022 (Originally 1976)    Diabetic foot exam  10/27/2021    A1C test (Diabetic or Prediabetic)  03/10/2022    Diabetic microalbuminuria test  03/10/2022    Lipid screen  04/21/2022    Cervical cancer screen  12/20/2022    DTaP/Tdap/Td vaccine (2 - Td) 12/11/2027    Pneumococcal 0-64 years Vaccine  Completed    HIV screen  Completed    Hepatitis A vaccine  Aged Out    Hib vaccine  Aged Out    Meningococcal (ACWY) vaccine  Aged Out             (applicable per patient's age: Cancer Screenings, Depression Screening, Fall Risk Screening, Immunizations)    Hemoglobin A1C (%)   Date Value   03/10/2021 6.9 (H)   10/27/2020 6.9   08/14/2019 6.8 (H)     Microalb/Crt.  Ratio (mcg/mg creat)   Date Value   07/29/2019 45 (H)     LDL Cholesterol (mg/dL)   Date Value   04/21/2021 27     LDL Calculated (mg/dL)   Date Value   08/05/2020 108     AST (U/L)   Date Value   03/10/2021 20     ALT (U/L)   Date Value   03/10/2021 22     BUN (mg/dL)   Date Value   03/10/2021 13      (goal A1C is < 7)   (goal LDL is <100) need 30-50% reduction from baseline     BP Readings from Last 3 Encounters:   04/21/21 124/82   11/10/20 (!) 128/92   10/27/20 (!) 169/98    (goal /80)      All Future Testing planned in [facility]:  Lab Frequency Next Occurrence   Comprehensive Metabolic Panel Once 39/10/4768   Hemoglobin A1C Once 02/02/2021   Microalbumin, Ur Once 02/02/2021       Next Visit Date:  Future Appointments   Date Time Provider Mehul Mccarthy   5/17/2021  2:30 PM Harriett Toure MD [address] [place] [address]            Patient Active Problem List:     DM II (diabetes mellitus, type II), controlled ([address]     Hemorrhoids     Paroxysmal hematoma of finger     Mixed hyperlipidemia

## 2021-05-17 ENCOUNTER — TELEPHONE (OUTPATIENT)
Dept: ONCOLOGY | Age: 46
End: 2021-05-17

## 2021-05-17 ENCOUNTER — INITIAL CONSULT (OUTPATIENT)
Dept: ONCOLOGY | Age: 46
End: 2021-05-17
Payer: COMMERCIAL

## 2021-05-17 ENCOUNTER — HOSPITAL ENCOUNTER (OUTPATIENT)
Age: 46
Discharge: HOME | End: 2021-05-17
Payer: COMMERCIAL

## 2021-05-17 VITALS
TEMPERATURE: 97.3 F | HEIGHT: 63 IN | HEART RATE: 98 BPM | DIASTOLIC BLOOD PRESSURE: 89 MMHG | WEIGHT: 179 LBS | BODY MASS INDEX: 31.71 KG/M2 | SYSTOLIC BLOOD PRESSURE: 145 MMHG

## 2021-05-17 DIAGNOSIS — D50.9 IRON DEFICIENCY ANEMIA, UNSPECIFIED IRON DEFICIENCY ANEMIA TYPE: ICD-10-CM

## 2021-05-17 DIAGNOSIS — D64.9 NORMOCYTIC ANEMIA: ICD-10-CM

## 2021-05-17 DIAGNOSIS — D50.8 IRON DEFICIENCY ANEMIA SECONDARY TO INADEQUATE DIETARY IRON INTAKE: Primary | ICD-10-CM

## 2021-05-17 DIAGNOSIS — K90.9 IRON MALABSORPTION: ICD-10-CM

## 2021-05-17 PROCEDURE — 36415 COLL VENOUS BLD VENIPUNCTURE: CPT

## 2021-05-17 PROCEDURE — 99244 OFF/OP CNSLTJ NEW/EST MOD 40: CPT | Performed by: INTERNAL MEDICINE

## 2021-05-17 PROCEDURE — 82607 VITAMIN B-12: CPT

## 2021-05-17 PROCEDURE — 82746 ASSAY OF FOLIC ACID SERUM: CPT

## 2021-05-17 PROCEDURE — 99211 OFF/OP EST MAY X REQ PHY/QHP: CPT | Performed by: INTERNAL MEDICINE

## 2021-05-17 RX ORDER — EPINEPHRINE 1 MG/ML
0.3 INJECTION, SOLUTION, CONCENTRATE INTRAVENOUS PRN
Status: CANCELLED | OUTPATIENT
Start: 2021-05-17

## 2021-05-17 RX ORDER — DIPHENHYDRAMINE HYDROCHLORIDE 50 MG/ML
50 INJECTION INTRAMUSCULAR; INTRAVENOUS ONCE
Status: CANCELLED | OUTPATIENT
Start: 2021-05-17 | End: 2021-05-17

## 2021-05-17 RX ORDER — SODIUM CHLORIDE 9 MG/ML
INJECTION, SOLUTION INTRAVENOUS CONTINUOUS
Status: CANCELLED | OUTPATIENT
Start: 2021-05-17

## 2021-05-17 RX ORDER — METHYLPREDNISOLONE SODIUM SUCCINATE 125 MG/2ML
125 INJECTION, POWDER, LYOPHILIZED, FOR SOLUTION INTRAMUSCULAR; INTRAVENOUS ONCE
Status: CANCELLED | OUTPATIENT
Start: 2021-05-17 | End: 2021-05-17

## 2021-05-17 RX ORDER — SODIUM CHLORIDE 9 MG/ML
25 INJECTION, SOLUTION INTRAVENOUS PRN
Status: CANCELLED | OUTPATIENT
Start: 2021-05-17

## 2021-05-17 RX ORDER — SODIUM CHLORIDE 0.9 % (FLUSH) 0.9 %
5-40 SYRINGE (ML) INJECTION PRN
Status: CANCELLED | OUTPATIENT
Start: 2021-05-17

## 2021-05-17 RX ORDER — HEPARIN SODIUM (PORCINE) LOCK FLUSH IV SOLN 100 UNIT/ML 100 UNIT/ML
500 SOLUTION INTRAVENOUS PRN
Status: CANCELLED | OUTPATIENT
Start: 2021-05-17

## 2021-05-17 NOTE — LETTER
Nose - normal and patent, no erythema, discharge or polyps  Mouth - mucous membranes moist, pharynx normal without lesions  Neck - supple, no significant adenopathy  Lymphatics - no palpable lymphadenopathy, no hepatosplenomegaly  Chest - clear to auscultation, no wheezes, rales or rhonchi, symmetric air entry  Heart - normal rate, regular rhythm, normal S1, S2, no murmurs, rubs, clicks or gallops  Abdomen - soft, nontender, nondistended, no masses or organomegaly  Neurological - alert, oriented, normal speech, no focal findings or movement disorder noted  Musculoskeletal - no joint tenderness, deformity or swelling  Extremities - peripheral pulses normal, no pedal edema, no clubbing or cyanosis  Skin - normal coloration and turgor, no rashes, no suspicious skin lesions noted     Review of Diagnostic data:   Lab Results   Component Value Date    WBC 5.8 04/21/2021    HGB 11.6 (L) 04/21/2021    HCT 37.4 04/21/2021    MCV 84.2 04/21/2021     04/21/2021       Chemistry        Component Value Date/Time     03/10/2021 1134    K 3.9 03/10/2021 1134     03/10/2021 1134    CO2 26 03/10/2021 1134    BUN 13 03/10/2021 1134    CREATININE 0.71 03/10/2021 1134        Component Value Date/Time    CALCIUM 8.9 03/10/2021 1134    ALKPHOS 63 03/10/2021 1134    ALKPHOS 54 10/27/2020 0000    AST 20 03/10/2021 1134    ALT 22 03/10/2021 1134    BILITOT 0.3 03/10/2021 1134            IMPRESSION:   Partially treated iron deficiency anemia  Problem malabsorption  Menorrhagia      PLAN: I reviewed the labs available to me and discussed with the patient. For more than 60 minutes of face to face discussion, I explained to the patient the nature of this hematologic problem. I explained the significance of these abnormalities in layman language. Reviewing patient's labs obviously she had evidence of iron deficiency anemia. She had mild response in the past but recently started getting worse again.   She is quite symptomatic from anemia. She did not have complete response despite using iron twice daily. I will check vitamin B12 and folate to make sure she does not have any other combined deficiency. If normal we will give the patient IV iron infusion soon. We will monitor response to treatment. Patient's questions were answered to the best of her satisfaction and she verbalized full understanding and agreement. If you have questions, please do not hesitate to call me. I look forward to following [facility] along with you. Sincerely,                            [facility]                            This note is created with the assistance of a speech recognition program.  While intending to generate a document that actually reflects the content of the visit, the document can still have some errors including those of syntax and sound a like substitutions which may escape proof reading. It such instances, actual meaning can be extrapolated by contextual diversion.

## 2021-05-17 NOTE — PROGRESS NOTES
· Ears: No hearing problems or drainage. No tinnitus. · Throat: No sore throat, problems with swallowing or dysphagia. · Respiratory: No cough, sputum or hemoptysis. No shortness of breath. No pleuritic chest pain. · Cardiovascular: No chest pain, orthopnea or PND. No lower extremity edema. No palpitation. · Gastrointestinal: No problems with swallowing. No abdominal pain or bloating. No nausea or vomiting. No diarrhea or constipation. No GI bleeding. · Genitourinary: No dysuria, hematuria, frequency or urgency. · Musculoskeletal: No muscle aches or pains. No limitation of movement. No back pain. No gait disturbance, No joint complaints. · Dermatologic: No skin rashes or pruritus. No skin lesions or discolorations. · Psychiatric: No depression, anxiety, or stress or signs of schizophrenia. No change in mood or affect. · Hematologic: No history of bleeding tendency. No bruises or ecchymosis. No history of clotting problems. · Infectious disease: No fever, chills or frequent infections. · Endocrine: No polydipsia or polyuria. No temperature intolerance. · Neurologic: No headaches or dizziness. No weakness or numbness of the extremities. No changes in balance, coordination,  memory, mentation, behavior. · Allergic/Immunologic: No nasal congestion or hives. No repeated infections. PHYSICAL EXAM:  The patient is not in acute distress. Vital signs: Blood pressure (!) 145/89, pulse 98, temperature 97.3 °F (36.3 °C), temperature source Oral, height 5' 3\" (1.6 m), weight 179 lb (81.2 kg).      General appearance - well appearing, not in pain or distress  Mental status - good mood, alert and oriented  Eyes - pupils equal and reactive, extraocular eye movements intact  Ears - bilateral TM's and external ear canals normal  Nose - normal and patent, no erythema, discharge or polyps  Mouth - mucous membranes moist, pharynx normal without lesions  Neck - supple, no significant adenopathy  Lymphatics - no palpable lymphadenopathy, no hepatosplenomegaly  Chest - clear to auscultation, no wheezes, rales or rhonchi, symmetric air entry  Heart - normal rate, regular rhythm, normal S1, S2, no murmurs, rubs, clicks or gallops  Abdomen - soft, nontender, nondistended, no masses or organomegaly  Neurological - alert, oriented, normal speech, no focal findings or movement disorder noted  Musculoskeletal - no joint tenderness, deformity or swelling  Extremities - peripheral pulses normal, no pedal edema, no clubbing or cyanosis  Skin - normal coloration and turgor, no rashes, no suspicious skin lesions noted     Review of Diagnostic data:   Lab Results   Component Value Date    WBC 5.8 04/21/2021    HGB 11.6 (L) 04/21/2021    HCT 37.4 04/21/2021    MCV 84.2 04/21/2021     04/21/2021       Chemistry        Component Value Date/Time     03/10/2021 1134    K 3.9 03/10/2021 1134     03/10/2021 1134    CO2 26 03/10/2021 1134    BUN 13 03/10/2021 1134    CREATININE 0.71 03/10/2021 1134        Component Value Date/Time    CALCIUM 8.9 03/10/2021 1134    ALKPHOS 63 03/10/2021 1134    ALKPHOS 54 10/27/2020 0000    AST 20 03/10/2021 1134    ALT 22 03/10/2021 1134    BILITOT 0.3 03/10/2021 1134            IMPRESSION:   Partially treated iron deficiency anemia  Problem malabsorption  Menorrhagia      PLAN: I reviewed the labs available to me and discussed with the patient. For more than 60 minutes of face to face discussion, I explained to the patient the nature of this hematologic problem. I explained the significance of these abnormalities in layman language. Reviewing patient's labs obviously she had evidence of iron deficiency anemia. She had mild response in the past but recently started getting worse again. She is quite symptomatic from anemia. She did not have complete response despite using iron twice daily.   I will check vitamin B12 and folate to make sure she does not have any other combined deficiency. If normal we will give the patient IV iron infusion soon. We will monitor response to treatment. Patient's questions were answered to the best of her satisfaction and she verbalized full understanding and agreement.

## 2021-05-17 NOTE — TELEPHONE ENCOUNTER
AVS from 5/17/21      Vitamin B12/ folate today  IV iron infusion soon  RV 4-6 months with CBC, Iron studies at RV    *vitamin b12 and folate 5/17/21  *avs given to chemo  for precert on IV iron infusion  *rv is scheduled for Ila@[facility]  *pt will have labs(vitamin b12&follate cbc,ferritin,iron,tibc) drawn 1 week prior rv    PT was given AVS and an appt schedule

## 2021-05-18 LAB
FOLATE: 13.1 NG/ML
VITAMIN B-12: 628 PG/ML (ref 232–1245)

## 2021-05-20 ENCOUNTER — HOSPITAL ENCOUNTER (OUTPATIENT)
Dept: INFUSION THERAPY | Age: 46
Discharge: HOME | End: 2021-05-20
Payer: COMMERCIAL

## 2021-05-20 VITALS
HEART RATE: 89 BPM | RESPIRATION RATE: 16 BRPM | TEMPERATURE: 98.7 F | SYSTOLIC BLOOD PRESSURE: 154 MMHG | DIASTOLIC BLOOD PRESSURE: 94 MMHG

## 2021-05-20 DIAGNOSIS — D50.8 IRON DEFICIENCY ANEMIA SECONDARY TO INADEQUATE DIETARY IRON INTAKE: ICD-10-CM

## 2021-05-20 DIAGNOSIS — K90.9 IRON MALABSORPTION: Primary | ICD-10-CM

## 2021-05-20 PROCEDURE — 6360000002 HC RX W HCPCS: Performed by: INTERNAL MEDICINE

## 2021-05-20 PROCEDURE — 96365 THER/PROPH/DIAG IV INF INIT: CPT

## 2021-05-20 PROCEDURE — 2580000003 HC RX 258: Performed by: INTERNAL MEDICINE

## 2021-05-20 RX ORDER — SODIUM CHLORIDE 9 MG/ML
INJECTION, SOLUTION INTRAVENOUS CONTINUOUS
Status: DISCONTINUED | OUTPATIENT
Start: 2021-05-20 | End: 2021-05-21 | Stop reason: HOSPADM

## 2021-05-20 RX ORDER — EPINEPHRINE 1 MG/ML
0.3 INJECTION, SOLUTION, CONCENTRATE INTRAVENOUS PRN
Status: CANCELLED | OUTPATIENT
Start: 2021-05-27

## 2021-05-20 RX ORDER — METHYLPREDNISOLONE SODIUM SUCCINATE 125 MG/2ML
125 INJECTION, POWDER, LYOPHILIZED, FOR SOLUTION INTRAMUSCULAR; INTRAVENOUS ONCE
Status: CANCELLED | OUTPATIENT
Start: 2021-05-27 | End: 2021-05-27

## 2021-05-20 RX ORDER — SODIUM CHLORIDE 0.9 % (FLUSH) 0.9 %
5-40 SYRINGE (ML) INJECTION PRN
Status: CANCELLED | OUTPATIENT
Start: 2021-05-27

## 2021-05-20 RX ORDER — SODIUM CHLORIDE 9 MG/ML
25 INJECTION, SOLUTION INTRAVENOUS PRN
Status: CANCELLED | OUTPATIENT
Start: 2021-05-27

## 2021-05-20 RX ORDER — SODIUM CHLORIDE 9 MG/ML
INJECTION, SOLUTION INTRAVENOUS CONTINUOUS
Status: CANCELLED | OUTPATIENT
Start: 2021-05-27

## 2021-05-20 RX ORDER — DIPHENHYDRAMINE HYDROCHLORIDE 50 MG/ML
50 INJECTION INTRAMUSCULAR; INTRAVENOUS ONCE
Status: CANCELLED | OUTPATIENT
Start: 2021-05-27 | End: 2021-05-27

## 2021-05-20 RX ORDER — HEPARIN SODIUM (PORCINE) LOCK FLUSH IV SOLN 100 UNIT/ML 100 UNIT/ML
500 SOLUTION INTRAVENOUS PRN
Status: CANCELLED | OUTPATIENT
Start: 2021-05-27

## 2021-05-20 RX ADMIN — FERRIC CARBOXYMALTOSE INJECTION 750 MG: 50 INJECTION, SOLUTION INTRAVENOUS at 14:48

## 2021-05-20 RX ADMIN — SODIUM CHLORIDE: 9 INJECTION, SOLUTION INTRAVENOUS at 14:35

## 2021-05-20 NOTE — PROGRESS NOTES
Pt here for #1 of 2 Injectafer. Arrives ambulatory with spouse. Infusion complete without incident. Pt stayed for 30 min observation, no s/s of reaction. Pt d/c'd in stable condition. Returns 5/27/21 for next dose of Injectafer.

## 2021-05-27 ENCOUNTER — HOSPITAL ENCOUNTER (OUTPATIENT)
Dept: INFUSION THERAPY | Age: 46
Discharge: HOME | End: 2021-05-27
Payer: COMMERCIAL

## 2021-05-27 VITALS
DIASTOLIC BLOOD PRESSURE: 89 MMHG | HEART RATE: 91 BPM | SYSTOLIC BLOOD PRESSURE: 154 MMHG | TEMPERATURE: 98.5 F | RESPIRATION RATE: 16 BRPM

## 2021-05-27 DIAGNOSIS — D50.8 IRON DEFICIENCY ANEMIA SECONDARY TO INADEQUATE DIETARY IRON INTAKE: ICD-10-CM

## 2021-05-27 DIAGNOSIS — K90.9 IRON MALABSORPTION: Primary | ICD-10-CM

## 2021-05-27 PROCEDURE — 6360000002 HC RX W HCPCS: Performed by: INTERNAL MEDICINE

## 2021-05-27 PROCEDURE — 2580000003 HC RX 258: Performed by: INTERNAL MEDICINE

## 2021-05-27 PROCEDURE — 96365 THER/PROPH/DIAG IV INF INIT: CPT

## 2021-05-27 RX ORDER — HEPARIN SODIUM (PORCINE) LOCK FLUSH IV SOLN 100 UNIT/ML 100 UNIT/ML
500 SOLUTION INTRAVENOUS PRN
Status: CANCELLED | OUTPATIENT
Start: 2021-05-27

## 2021-05-27 RX ORDER — SODIUM CHLORIDE 9 MG/ML
25 INJECTION, SOLUTION INTRAVENOUS PRN
Status: CANCELLED | OUTPATIENT
Start: 2021-05-27

## 2021-05-27 RX ORDER — METHYLPREDNISOLONE SODIUM SUCCINATE 125 MG/2ML
125 INJECTION, POWDER, LYOPHILIZED, FOR SOLUTION INTRAMUSCULAR; INTRAVENOUS ONCE
Status: CANCELLED | OUTPATIENT
Start: 2021-05-27 | End: 2021-05-27

## 2021-05-27 RX ORDER — SODIUM CHLORIDE 9 MG/ML
INJECTION, SOLUTION INTRAVENOUS CONTINUOUS
Status: DISCONTINUED | OUTPATIENT
Start: 2021-05-27 | End: 2021-05-28 | Stop reason: HOSPADM

## 2021-05-27 RX ORDER — SODIUM CHLORIDE 9 MG/ML
INJECTION, SOLUTION INTRAVENOUS CONTINUOUS
Status: CANCELLED | OUTPATIENT
Start: 2021-05-27

## 2021-05-27 RX ORDER — DIPHENHYDRAMINE HYDROCHLORIDE 50 MG/ML
50 INJECTION INTRAMUSCULAR; INTRAVENOUS ONCE
Status: CANCELLED | OUTPATIENT
Start: 2021-05-27 | End: 2021-05-27

## 2021-05-27 RX ORDER — EPINEPHRINE 1 MG/ML
0.3 INJECTION, SOLUTION, CONCENTRATE INTRAVENOUS PRN
Status: CANCELLED | OUTPATIENT
Start: 2021-05-27

## 2021-05-27 RX ORDER — SODIUM CHLORIDE 0.9 % (FLUSH) 0.9 %
5-40 SYRINGE (ML) INJECTION PRN
Status: CANCELLED | OUTPATIENT
Start: 2021-05-27

## 2021-05-27 RX ADMIN — SODIUM CHLORIDE: 9 INJECTION, SOLUTION INTRAVENOUS at 14:06

## 2021-05-27 RX ADMIN — FERRIC CARBOXYMALTOSE INJECTION 750 MG: 50 INJECTION, SOLUTION INTRAVENOUS at 14:12

## 2021-05-27 NOTE — PROGRESS NOTES
Pt here for #2 of 2 iron infusions. Denies any problems. Tolerates  very well. Will return 9/13 for Dr visit.

## 2021-06-28 DIAGNOSIS — E11.9 CONTROLLED TYPE 2 DIABETES MELLITUS WITHOUT COMPLICATION, WITHOUT LONG-TERM CURRENT USE OF INSULIN (HCC): ICD-10-CM

## 2021-07-04 DIAGNOSIS — I10 ESSENTIAL HYPERTENSION: ICD-10-CM

## 2021-07-05 NOTE — TELEPHONE ENCOUNTER
Last visit: 4/21/21  Last Med refill: 4/2/21  Next Visit Date:  Future Appointments   Date Time Provider Mehul Mccarthy   9/13/2021  2:15 PM Devin Quezada MD [facility] Maintenance   Topic Date Due    COVID-19 Vaccine (1) 10/29/2021 (Originally 1/8/1988)    Diabetic retinal exam  04/21/2022 (Originally 1/8/1986)    Hepatitis B vaccine (1 of 3 - Risk 3-dose series) 04/21/2022 (Originally 1/8/1995)    Hepatitis C screen  04/21/2022 (Originally 1976)    Flu vaccine (1) 09/01/2021    Diabetic foot exam  10/27/2021    A1C test (Diabetic or Prediabetic)  03/10/2022    Diabetic microalbuminuria test  03/10/2022    Lipid screen  04/21/2022    Cervical cancer screen  12/20/2022    DTaP/Tdap/Td vaccine (2 - Td or Tdap) 12/11/2027    Pneumococcal 0-64 years Vaccine (2 of 2 - PPSV23) 01/08/2041    HIV screen  Completed    Hepatitis A vaccine  Aged Out    Hib vaccine  Aged Out    Meningococcal (ACWY) vaccine  Aged Out       Hemoglobin A1C (%)   Date Value   03/10/2021 6.9 (H)   10/27/2020 6.9   08/14/2019 6.8 (H)             ( goal A1C is < 7)   Microalb/Crt.  Ratio (mcg/mg creat)   Date Value   07/29/2019 45 (H)     LDL Cholesterol (mg/dL)   Date Value   04/21/2021 27     LDL Calculated (mg/dL)   Date Value   08/05/2020 108   08/14/2019 92       (goal LDL is <100)   AST (U/L)   Date Value   03/10/2021 20     ALT (U/L)   Date Value   03/10/2021 22     BUN (mg/dL)   Date Value   03/10/2021 13     BP Readings from Last 3 Encounters:   05/27/21 (!) 154/89   05/20/21 (!) 154/94   05/17/21 (!) 145/89          (goal 120/80)    All Future Testing planned in [facility]  Lab Frequency Next Occurrence   Comprehensive Metabolic Panel Once 82/18/8426   Hemoglobin A1C Once 02/02/2021   Microalbumin, Ur Once 02/02/2021               Patient Active Problem List:     DM II (diabetes mellitus, type II), controlled ([facility] [address]     Hemorrhoids     Paroxysmal hematoma of finger     Mixed hyperlipidemia Iron malabsorption     Iron deficiency anemia secondary to inadequate dietary iron intake

## 2021-07-06 RX ORDER — LOSARTAN POTASSIUM 50 MG/1
50 TABLET ORAL DAILY
Qty: 30 TABLET | Refills: 3 | Status: SHIPPED | OUTPATIENT
Start: 2021-07-06 | End: 2021-11-17 | Stop reason: SDUPTHER

## 2021-09-13 ENCOUNTER — OFFICE VISIT (OUTPATIENT)
Dept: ONCOLOGY | Age: 46
End: 2021-09-13
Payer: COMMERCIAL

## 2021-09-13 ENCOUNTER — TELEPHONE (OUTPATIENT)
Dept: ONCOLOGY | Age: 46
End: 2021-09-13

## 2021-09-13 VITALS
BODY MASS INDEX: 30.68 KG/M2 | TEMPERATURE: 97.4 F | SYSTOLIC BLOOD PRESSURE: 143 MMHG | DIASTOLIC BLOOD PRESSURE: 94 MMHG | WEIGHT: 173.2 LBS | HEART RATE: 87 BPM

## 2021-09-13 DIAGNOSIS — D50.8 IRON DEFICIENCY ANEMIA SECONDARY TO INADEQUATE DIETARY IRON INTAKE: ICD-10-CM

## 2021-09-13 DIAGNOSIS — K90.9 IRON MALABSORPTION: Primary | ICD-10-CM

## 2021-09-13 PROCEDURE — 99214 OFFICE O/P EST MOD 30 MIN: CPT | Performed by: INTERNAL MEDICINE

## 2021-09-13 PROCEDURE — 99211 OFF/OP EST MAY X REQ PHY/QHP: CPT | Performed by: INTERNAL MEDICINE

## 2021-09-13 NOTE — TELEPHONE ENCOUNTER
AVS from 9/13/21     RV 6 months with CBC, Iron studies at RV      *rv is sched for [email or web address] w/labs done 1 week prior(ferritin,iron,tibc,cbc)    PT was given AVS and an appt schedule

## 2021-09-14 NOTE — PROGRESS NOTES
cancer. Otherwise negative for any hematological or oncological conditions. SOCIAL HISTORY:  reports that she has never smoked. She has never used smokeless tobacco. She reports that she does not drink alcohol and does not use drugs. REVIEW OF SYSTEMS:     · General: Positive for weakness and fatigue. No unanticipated weight loss or decreased appetite. No fever or chills. · Eyes: No blurred vision, eye pain or double vision. · Ears: No hearing problems or drainage. No tinnitus. · Throat: No sore throat, problems with swallowing or dysphagia. · Respiratory: No cough, sputum or hemoptysis. No shortness of breath. No pleuritic chest pain. · Cardiovascular: No chest pain, orthopnea or PND. No lower extremity edema. No palpitation. · Gastrointestinal: No problems with swallowing. No abdominal pain or bloating. No nausea or vomiting. No diarrhea or constipation. No GI bleeding. · Genitourinary: No dysuria, hematuria, frequency or urgency. · Musculoskeletal: No muscle aches or pains. No limitation of movement. No back pain. No gait disturbance, No joint complaints. · Dermatologic: No skin rashes or pruritus. No skin lesions or discolorations. · Psychiatric: No depression, anxiety, or stress or signs of schizophrenia. No change in mood or affect. · Hematologic: No history of bleeding tendency. No bruises or ecchymosis. No history of clotting problems. · Infectious disease: No fever, chills or frequent infections. · Endocrine: No polydipsia or polyuria. No temperature intolerance. · Neurologic: No headaches or dizziness. No weakness or numbness of the extremities. No changes in balance, coordination,  memory, mentation, behavior. · Allergic/Immunologic: No nasal congestion or hives. No repeated infections. PHYSICAL EXAM:  The patient is not in acute distress.   Vital signs: Blood pressure (!) 143/94, pulse 87, temperature 97.4 °F (36.3 °C), temperature source Temporal, weight 173 lb 3.2 oz (78.6 kg). General appearance - well appearing, not in pain or distress  Mental status - good mood, alert and oriented  Eyes - pupils equal and reactive, extraocular eye movements intact  Ears - bilateral TM's and external ear canals normal  Nose - normal and patent, no erythema, discharge or polyps  Mouth - mucous membranes moist, pharynx normal without lesions  Neck - supple, no significant adenopathy  Lymphatics - no palpable lymphadenopathy, no hepatosplenomegaly  Chest - clear to auscultation, no wheezes, rales or rhonchi, symmetric air entry  Heart - normal rate, regular rhythm, normal S1, S2, no murmurs, rubs, clicks or gallops  Abdomen - soft, nontender, nondistended, no masses or organomegaly  Neurological - alert, oriented, normal speech, no focal findings or movement disorder noted  Musculoskeletal - no joint tenderness, deformity or swelling  Extremities - peripheral pulses normal, no pedal edema, no clubbing or cyanosis  Skin - normal coloration and turgor, no rashes, no suspicious skin lesions noted     Review of Diagnostic data:   Lab Results   Component Value Date    WBC 5.8 04/21/2021    HGB 11.6 (L) 04/21/2021    HCT 37.4 04/21/2021    MCV 84.2 04/21/2021     04/21/2021       Chemistry        Component Value Date/Time     03/10/2021 1134    K 3.9 03/10/2021 1134     03/10/2021 1134    CO2 26 03/10/2021 1134    BUN 13 03/10/2021 1134    CREATININE 0.71 03/10/2021 1134        Component Value Date/Time    CALCIUM 8.9 03/10/2021 1134    ALKPHOS 63 03/10/2021 1134    ALKPHOS 54 10/27/2020 0000    AST 20 03/10/2021 1134    ALT 22 03/10/2021 1134    BILITOT 0.3 03/10/2021 1134        Lab Results   Component Value Date    IRON 46 11/09/2018    TIBC 430 (H) 08/05/2020    FERRITIN 28 04/21/2021         IMPRESSION:   Partially treated iron deficiency anemia  Probable malabsorption  Menorrhagia      PLAN: I reviewed the labs as above and discussed with the patient.  I explained to the patient the nature of this hematologic problem. I explained the significance of these abnormalities in layman language. Reviewing patient's labs obviously she had evidence of iron deficiency anemia. She had mild response in the past but recently started getting worse again. She was given IV iron infusion. Repeated  Labs showed very good response. Iron studies and vitamin B12 and folate were all normal.  Will repeat labs in 6 months. Patient's questions were answered to the best of her satisfaction and she verbalized full understanding and agreement.

## 2021-11-17 DIAGNOSIS — I10 ESSENTIAL HYPERTENSION: ICD-10-CM

## 2021-11-17 DIAGNOSIS — E78.2 MIXED HYPERLIPIDEMIA: ICD-10-CM

## 2021-11-18 RX ORDER — ATORVASTATIN CALCIUM 10 MG/1
10 TABLET, FILM COATED ORAL DAILY
Qty: 90 TABLET | Refills: 1 | Status: SHIPPED | OUTPATIENT
Start: 2021-11-18 | End: 2022-06-22 | Stop reason: SDUPTHER

## 2021-11-18 RX ORDER — LOSARTAN POTASSIUM 50 MG/1
50 TABLET ORAL DAILY
Qty: 30 TABLET | Refills: 3 | Status: SHIPPED | OUTPATIENT
Start: 2021-11-18 | End: 2022-04-12

## 2021-11-18 NOTE — TELEPHONE ENCOUNTER
Last visit: 4/21/21  Last Med refill: 5/3/21    7/6/21    Next Visit Date:  Future Appointments   Date Time Provider Mehul Mccarthy   3/7/2022  1:30 PM Claudell Morale, MD [facility]   Topic Date Due    COVID-19 Vaccine (1) Never done    Colon cancer screen colonoscopy  Never done    Flu vaccine (1) Never done    Diabetic foot exam  10/27/2021    Diabetic retinal exam  04/21/2022 (Originally 1/8/1986)    Hepatitis B vaccine (1 of 3 - Risk 3-dose series) 04/21/2022 (Originally 1/8/1995)    Hepatitis C screen  04/21/2022 (Originally 1975)    A1C test (Diabetic or Prediabetic)  03/10/2022    Diabetic microalbuminuria test  03/10/2022    Lipid screen  04/21/2022    Cervical cancer screen  12/21/2022    DTaP/Tdap/Td vaccine (2 - Td or Tdap) 12/11/2027    Pneumococcal 0-64 years Vaccine (2 of 2 - PPSV23) 01/08/2041    HIV screen  Completed    Hepatitis A vaccine  Aged Out    Hib vaccine  Aged Out    Meningococcal (ACWY) vaccine  Aged Out       Hemoglobin A1C (%)   Date Value   03/10/2021 6.9 (H)   10/27/2020 6.9   08/14/2019 6.8 (H)             ( goal A1C is < 7)   Microalb/Crt.  Ratio (mcg/mg creat)   Date Value   07/29/2019 45 (H)     LDL Cholesterol (mg/dL)   Date Value   04/21/2021 27     LDL Calculated (mg/dL)   Date Value   08/05/2020 108   08/14/2019 92       (goal LDL is <100)   AST (U/L)   Date Value   03/10/2021 20     ALT (U/L)   Date Value   03/10/2021 22     BUN (mg/dL)   Date Value   03/10/2021 13     BP Readings from Last 3 Encounters:   09/13/21 (!) 143/94   05/27/21 (!) 154/89   05/20/21 (!) 154/94          (goal 120/80)    All Future Testing planned in [facility]  Lab Frequency Next Occurrence               Patient Active Problem List:     DM II (diabetes mellitus, type II), controlled ([address]     Hemorrhoids     Paroxysmal hematoma of finger     Mixed hyperlipidemia     Iron malabsorption     Iron deficiency anemia secondary to inadequate dietary iron intake

## 2022-01-14 ENCOUNTER — TELEPHONE (OUTPATIENT)
Dept: FAMILY MEDICINE CLINIC | Age: 47
End: 2022-01-14

## 2022-01-14 DIAGNOSIS — E11.9 CONTROLLED TYPE 2 DIABETES MELLITUS WITHOUT COMPLICATION, WITHOUT LONG-TERM CURRENT USE OF INSULIN (HCC): ICD-10-CM

## 2022-01-14 NOTE — TELEPHONE ENCOUNTER
Patient called inquiring about her Metformin refill. It has been refused twice. Why? Patient was not asked to return for a visit until April 2022. Patient will be out of medication.

## 2022-01-14 NOTE — TELEPHONE ENCOUNTER
Last visit: 04/21/21  Last Med refill: 06/29/21  Does patient have enough medication for 72 hours: No. Pharmacy verified. Next Visit Date:  Future Appointments   Date Time Provider Mehul Mccarthy   3/4/2022  2:15 PM Srinivas Palma MD [facility] Maintenance   Topic Date Due    COVID-19 Vaccine (1) Never done    Colon cancer screen colonoscopy  Never done    Flu vaccine (1) Never done    Diabetic foot exam  10/27/2021    Diabetic retinal exam  04/21/2022 (Originally 1/8/1994)    Hepatitis B vaccine (1 of 3 - Risk 3-dose series) 04/21/2022 (Originally 1/8/1995)    Hepatitis C screen  04/21/2022 (Originally 1975)    A1C test (Diabetic or Prediabetic)  03/10/2022    Diabetic microalbuminuria test  03/10/2022    Lipid screen  04/21/2022    Depression Screen  04/21/2022    Cervical cancer screen  12/21/2022    DTaP/Tdap/Td vaccine (2 - Td or Tdap) 12/11/2027    Pneumococcal 0-64 years Vaccine (2 of 2 - PPSV23) 01/08/2041    HIV screen  Completed    Hepatitis A vaccine  Aged Out    Hib vaccine  Aged Out    Meningococcal (ACWY) vaccine  Aged Out       Hemoglobin A1C (%)   Date Value   03/10/2021 6.9 (H)   10/27/2020 6.9   08/14/2019 6.8 (H)             ( goal A1C is < 7)   Microalb/Crt.  Ratio (mcg/mg creat)   Date Value   07/29/2019 45 (H)     LDL Cholesterol (mg/dL)   Date Value   04/21/2021 27     LDL Calculated (mg/dL)   Date Value   08/05/2020 108   08/14/2019 92       (goal LDL is <100)   AST (U/L)   Date Value   03/10/2021 20     ALT (U/L)   Date Value   03/10/2021 22     BUN (mg/dL)   Date Value   03/10/2021 13     BP Readings from Last 3 Encounters:   09/13/21 (!) 143/94   05/27/21 (!) 154/89   05/20/21 (!) 154/94          (goal 120/80)    All Future Testing planned in [facility]  Lab Frequency Next Occurrence               Patient Active Problem List:     DM II (diabetes mellitus, type II), controlled ([address]     Hemorrhoids     Paroxysmal hematoma of finger Mixed hyperlipidemia     Iron malabsorption     Iron deficiency anemia secondary to inadequate dietary iron intake

## 2022-03-04 ENCOUNTER — TELEPHONE (OUTPATIENT)
Dept: ONCOLOGY | Age: 47
End: 2022-03-04

## 2022-03-04 ENCOUNTER — OFFICE VISIT (OUTPATIENT)
Dept: ONCOLOGY | Age: 47
End: 2022-03-04
Payer: COMMERCIAL

## 2022-03-04 VITALS
WEIGHT: 180 LBS | DIASTOLIC BLOOD PRESSURE: 110 MMHG | TEMPERATURE: 97.7 F | BODY MASS INDEX: 31.89 KG/M2 | RESPIRATION RATE: 16 BRPM | SYSTOLIC BLOOD PRESSURE: 163 MMHG | HEART RATE: 99 BPM

## 2022-03-04 DIAGNOSIS — D64.9 NORMOCYTIC ANEMIA: Primary | ICD-10-CM

## 2022-03-04 DIAGNOSIS — K90.9 IRON MALABSORPTION: ICD-10-CM

## 2022-03-04 DIAGNOSIS — D50.8 IRON DEFICIENCY ANEMIA SECONDARY TO INADEQUATE DIETARY IRON INTAKE: ICD-10-CM

## 2022-03-04 PROCEDURE — 99214 OFFICE O/P EST MOD 30 MIN: CPT | Performed by: INTERNAL MEDICINE

## 2022-03-04 PROCEDURE — 99211 OFF/OP EST MAY X REQ PHY/QHP: CPT | Performed by: INTERNAL MEDICINE

## 2022-03-04 NOTE — PROGRESS NOTES
_        Chief Complaint   Patient presents with    Follow-up    Results     labs      DIAGNOSIS:       Partially treated iron deficiency anemia  Probable malabsorption  Menorrhagia    CURRENT THERAPY:         IV iron infusion June 2021     BRIEF CASE HISTORY:      Ms. Ozzy Walker is a very pleasant 56 y.o. female with history of multiple co morbidities as listed. Patient is referred for further evaluation of anemia. The patient states that about 2 to 3 years ago she had blood work which revealed iron deficiency. She was started on oral iron 1 tablet daily and due to lack of response was made twice daily. Patient was having problems with weakness and fatigue and feeling tired. Symptoms were persistent and recently were getting worse. Rare dizziness. Occasional palpitation. Patient has history of heavy menses with passage of clots. No other source of bleeding. No melena or hematochezia. No hematemesis. No hematuria. Patient denies any weight loss or decreased appetite. No fever or night sweats. No other complaints. Patient denies smoking. Rare social alcohol drinking. .   INTERIM HISTORY:   Seen for follow up iron deficiency anemia. S/p iv iron infusion. Felt better. Less weakness and fatigue. No active bleeding. No other complaints. PAST MEDICAL HISTORY: has a past medical history of Allergic rhinitis, Anemia, DM II (diabetes mellitus, type II), controlled ([address] Gestational diabetes, Hyperlipidemia, Hyperthyroidism affecting pregnancy, and Incompetent cervix. PAST SURGICAL HISTORY: has a past surgical history that includes Cervix surgery. CURRENT MEDICATIONS:  has a current medication list which includes the following prescription(s): metformin, atorvastatin, losartan, vitamin d3, and cinnamon. ALLERGIES:  has No Known Allergies. FAMILY HISTORY: Grandfather had lung cancer.   Otherwise negative for any hematological or oncological conditions. SOCIAL HISTORY:  reports that she has never smoked. She has never used smokeless tobacco. She reports that she does not drink alcohol and does not use drugs. REVIEW OF SYSTEMS:     · General: Positive for weakness and fatigue. No unanticipated weight loss or decreased appetite. No fever or chills. · Eyes: No blurred vision, eye pain or double vision. · Ears: No hearing problems or drainage. No tinnitus. · Throat: No sore throat, problems with swallowing or dysphagia. · Respiratory: No cough, sputum or hemoptysis. No shortness of breath. No pleuritic chest pain. · Cardiovascular: No chest pain, orthopnea or PND. No lower extremity edema. No palpitation. · Gastrointestinal: No problems with swallowing. No abdominal pain or bloating. No nausea or vomiting. No diarrhea or constipation. No GI bleeding. · Genitourinary: No dysuria, hematuria, frequency or urgency. · Musculoskeletal: No muscle aches or pains. No limitation of movement. No back pain. No gait disturbance, No joint complaints. · Dermatologic: No skin rashes or pruritus. No skin lesions or discolorations. · Psychiatric: No depression, anxiety, or stress or signs of schizophrenia. No change in mood or affect. · Hematologic: No history of bleeding tendency. No bruises or ecchymosis. No history of clotting problems. · Infectious disease: No fever, chills or frequent infections. · Endocrine: No polydipsia or polyuria. No temperature intolerance. · Neurologic: No headaches or dizziness. No weakness or numbness of the extremities. No changes in balance, coordination,  memory, mentation, behavior. · Allergic/Immunologic: No nasal congestion or hives. No repeated infections. PHYSICAL EXAM:  The patient is not in acute distress. Vital signs: Blood pressure (!) 163/110, pulse 99, temperature 97.7 °F (36.5 °C), temperature source Temporal, resp.  rate 16, weight 180 lb (81.6 kg). General appearance - well appearing, not in pain or distress  Mental status - good mood, alert and oriented  Eyes - pupils equal and reactive, extraocular eye movements intact  Ears - bilateral TM's and external ear canals normal  Nose - normal and patent, no erythema, discharge or polyps  Mouth - mucous membranes moist, pharynx normal without lesions  Neck - supple, no significant adenopathy  Lymphatics - no palpable lymphadenopathy, no hepatosplenomegaly  Chest - clear to auscultation, no wheezes, rales or rhonchi, symmetric air entry  Heart - normal rate, regular rhythm, normal S1, S2, no murmurs, rubs, clicks or gallops  Abdomen - soft, nontender, nondistended, no masses or organomegaly  Neurological - alert, oriented, normal speech, no focal findings or movement disorder noted  Musculoskeletal - no joint tenderness, deformity or swelling  Extremities - peripheral pulses normal, no pedal edema, no clubbing or cyanosis  Skin - normal coloration and turgor, no rashes, no suspicious skin lesions noted     Review of Diagnostic data:   Lab Results   Component Value Date    WBC 5.8 04/21/2021    HGB 11.6 (L) 04/21/2021    HCT 37.4 04/21/2021    MCV 84.2 04/21/2021     04/21/2021       Chemistry        Component Value Date/Time     03/10/2021 1134    K 3.9 03/10/2021 1134     03/10/2021 1134    CO2 26 03/10/2021 1134    BUN 13 03/10/2021 1134    CREATININE 0.71 03/10/2021 1134        Component Value Date/Time    CALCIUM 8.9 03/10/2021 1134    ALKPHOS 63 03/10/2021 1134    ALKPHOS 54 10/27/2020 0000    AST 20 03/10/2021 1134    ALT 22 03/10/2021 1134    BILITOT 0.3 03/10/2021 1134        Lab Results   Component Value Date    IRON 46 11/09/2018    TIBC 430 (H) 08/05/2020    FERRITIN 28 04/21/2021         IMPRESSION:   Partially treated iron deficiency anemia  Probable malabsorption  Menorrhagia      PLAN: I reviewed the labs as above and discussed with the patient.  I explained to the patient the nature of this hematologic problem. I explained the significance of these abnormalities in layman language. Reviewing patient's labs obviously she had evidence of iron deficiency anemia. She had mild response in the past but recently started getting worse again. She was given IV iron infusion. Repeated  Labs showed very good response. Iron studies and vitamin B12 and folate were all normal.  Will repeat labs in 12 months. Patient's questions were answered to the best of her satisfaction and she verbalized full understanding and agreement.

## 2022-03-04 NOTE — TELEPHONE ENCOUNTER
RV 12 months with CBC, Iron studies at RV    AVS placed in recall drawer, pt will be called once schedule is built    PT was given AVS and an appt schedule    Electronically signed by Jennifer Ford on 3/4/2022 at 3:40 PM

## 2022-04-11 DIAGNOSIS — I10 ESSENTIAL HYPERTENSION: ICD-10-CM

## 2022-04-11 NOTE — TELEPHONE ENCOUNTER
Last visit: 04/21/21  Last Med refill: 11/18/21  Does patient have enough medication for 72 hours: yes    Next Visit Date:  No future appointments. Health Maintenance   Topic Date Due    COVID-19 Vaccine (1) Never done    Colorectal Cancer Screen  Never done    Diabetic foot exam  10/27/2021    A1C test (Diabetic or Prediabetic)  03/10/2022    Diabetic microalbuminuria test  03/10/2022    Lipid screen  04/21/2022    Depression Screen  04/21/2022    Diabetic retinal exam  04/21/2022 (Originally 1/8/1994)    Hepatitis B vaccine (1 of 3 - Risk 3-dose series) 04/21/2022 (Originally 1/8/1995)    Hepatitis C screen  04/21/2022 (Originally 1975)    Flu vaccine (Season Ended) 09/01/2022    Cervical cancer screen  12/21/2022    DTaP/Tdap/Td vaccine (2 - Td or Tdap) 12/11/2027    Pneumococcal 0-64 years Vaccine (2 of 2 - PPSV23) 01/08/2041    HIV screen  Completed    Hepatitis A vaccine  Aged Out    Hib vaccine  Aged Out    Meningococcal (ACWY) vaccine  Aged Out       Hemoglobin A1C (%)   Date Value   03/10/2021 6.9 (H)   10/27/2020 6.9   08/14/2019 6.8 (H)             ( goal A1C is < 7)   Microalb/Crt.  Ratio (mcg/mg creat)   Date Value   07/29/2019 45 (H)     LDL Cholesterol (mg/dL)   Date Value   04/21/2021 27     LDL Calculated (mg/dL)   Date Value   08/05/2020 108   08/14/2019 92       (goal LDL is <100)   AST (U/L)   Date Value   03/10/2021 20     ALT (U/L)   Date Value   03/10/2021 22     BUN (mg/dL)   Date Value   03/10/2021 13     BP Readings from Last 3 Encounters:   03/04/22 (!) 163/110   09/13/21 (!) 143/94   05/27/21 (!) 154/89          (goal 120/80)    All Future Testing planned in [facility]  Lab Frequency Next Occurrence               Patient Active Problem List:     DM II (diabetes mellitus, type II), controlled ([address]     Hemorrhoids     Paroxysmal hematoma of finger     Mixed hyperlipidemia     Iron malabsorption     Iron deficiency anemia secondary to inadequate dietary iron intake

## 2022-04-12 RX ORDER — LOSARTAN POTASSIUM 50 MG/1
TABLET ORAL
Qty: 30 TABLET | Refills: 3 | Status: SHIPPED | OUTPATIENT
Start: 2022-04-12 | End: 2022-09-01 | Stop reason: SDUPTHER

## 2022-06-22 ENCOUNTER — TELEPHONE (OUTPATIENT)
Dept: SURGERY | Age: 47
End: 2022-06-22

## 2022-06-22 ENCOUNTER — OFFICE VISIT (OUTPATIENT)
Dept: FAMILY MEDICINE CLINIC | Age: 47
End: 2022-06-22
Payer: COMMERCIAL

## 2022-06-22 VITALS
HEIGHT: 63 IN | WEIGHT: 175.2 LBS | SYSTOLIC BLOOD PRESSURE: 142 MMHG | DIASTOLIC BLOOD PRESSURE: 88 MMHG | BODY MASS INDEX: 31.04 KG/M2 | HEART RATE: 88 BPM | OXYGEN SATURATION: 99 % | TEMPERATURE: 98.4 F

## 2022-06-22 DIAGNOSIS — Z86.39 HISTORY OF HYPERTHYROIDISM: ICD-10-CM

## 2022-06-22 DIAGNOSIS — E78.2 MIXED HYPERLIPIDEMIA: ICD-10-CM

## 2022-06-22 DIAGNOSIS — Z12.31 ENCOUNTER FOR SCREENING MAMMOGRAM FOR BREAST CANCER: ICD-10-CM

## 2022-06-22 DIAGNOSIS — D50.8 OTHER IRON DEFICIENCY ANEMIA: ICD-10-CM

## 2022-06-22 DIAGNOSIS — E11.9 CONTROLLED TYPE 2 DIABETES MELLITUS WITHOUT COMPLICATION, WITHOUT LONG-TERM CURRENT USE OF INSULIN (HCC): Primary | ICD-10-CM

## 2022-06-22 DIAGNOSIS — Z12.11 SCREEN FOR COLON CANCER: ICD-10-CM

## 2022-06-22 DIAGNOSIS — I10 ESSENTIAL HYPERTENSION: ICD-10-CM

## 2022-06-22 PROCEDURE — 99214 OFFICE O/P EST MOD 30 MIN: CPT | Performed by: FAMILY MEDICINE

## 2022-06-22 RX ORDER — ATORVASTATIN CALCIUM 10 MG/1
10 TABLET, FILM COATED ORAL DAILY
Qty: 90 TABLET | Refills: 3 | Status: SHIPPED | OUTPATIENT
Start: 2022-06-22

## 2022-06-22 ASSESSMENT — PATIENT HEALTH QUESTIONNAIRE - PHQ9
SUM OF ALL RESPONSES TO PHQ9 QUESTIONS 1 & 2: 0
SUM OF ALL RESPONSES TO PHQ QUESTIONS 1-9: 0
2. FEELING DOWN, DEPRESSED OR HOPELESS: 0
1. LITTLE INTEREST OR PLEASURE IN DOING THINGS: 0

## 2022-06-22 ASSESSMENT — ENCOUNTER SYMPTOMS
CONSTIPATION: 0
BLOOD IN STOOL: 0
DIARRHEA: 0
ABDOMINAL PAIN: 0

## 2022-06-22 NOTE — PATIENT INSTRUCTIONS
Patient Education        Learning About High Cholesterol  What is high cholesterol? High cholesterol means that you have too much cholesterol in your blood. Cholesterol is a type of fat. It's needed for many body functions, such as making new cells. Cholesterol is made by your body. It also comes from food youeat. Having high cholesterol can lead to the buildup of plaque in artery walls. Thiscan increase your risk of heart attack and stroke. When your doctor talks about high cholesterol levels, your doctor is talking about your total cholesterol and LDL cholesterol (the \"bad\" cholesterol) levels. Your doctor may also speak about HDL (the \"good\" cholesterol) levels. High HDL is linked with a lower risk for coronary artery disease, heart attack,and stroke. Your cholesterol levels help your doctor find out your risk for having a heartattack or stroke. How can you help prevent high cholesterol? A heart-healthy lifestyle can help you prevent high cholesterol and lower yourrisk for a heart attack and stroke.  Eat heart-healthy foods. ? Eat fruits, vegetables, whole grains, beans, and other high-fiber foods. ? Eat lean proteins, such as seafood, lean meats, beans, nuts, and soy products. ? Eat healthy fats, such as canola and olive oil. ? Choose foods that are low in saturated fat. ? Limit sodium and alcohol. ? Limit drinks and foods with added sugar.  Be active. Try to do moderate activity at least 2½ hours a week. Or try vigorous activity at least 1¼ hours a week. You may want to walk or try other activities, such as running, swimming, cycling, or playing tennis or team sports.  Stay at a healthy weight. Lose weight if you need to.  Don't smoke. If you need help quitting, talk to your doctor about stop-smoking programs and medicines. These can increase your chances of quitting for good. How is high cholesterol treated?   The goal of treatment is to reduce your chances of having a heart attack orstroke. The goal is not to lower your cholesterol numbers only.  Have a heart-healthy lifestyle. This includes eating healthy foods, not smoking, losing weight, and being more active.  You may choose to take medicine. Follow-up care is a key part of your treatment and safety. Be sure to make and go to all appointments, and call your doctor if you are having problems. It's also a good idea to know your test results and keep alist of the medicines you take. Where can you learn more? Go to https://[email or web address].[email or web address]. org and sign in to your [employer] account. Enter V630 in the [employer] box to learn more about \"Learning About High Cholesterol. \"     If you do not have an account, please click on the \"Sign Up Now\" link. Current as of: January 10, 2022               Content Version: 13.3  © 8826-1037 Healthwise, Incorporated. Care instructions adapted under license by [facility] ([facility]). If you have questions about a medical condition or this instruction, always ask your healthcare professional. [address] any warranty or liability for your use of this information.

## 2022-06-22 NOTE — PROGRESS NOTES
[address]  [address], [address]  [phone]      Kasey Amador is a 56 y.o. female who presents today for her medical conditions/complaints as noted below. Kasey Amador is c/o of Establish Care      HPI:     Pt here today to establish - was most recently seeing Marissa Pang and Dr. Jackson Dempsey at [facility]; last visit slightly >1 year ago. Diagnosed with type 2 DM in [id number]. Last A1c was 6.9% on 3/10/21. Taking Metformin 500 mg BID for DM - stable. Was increased to BID dosing a couple years ago when her A1c increased slightly; had been taking 500 mg daily. Sees eye doctor in [facility] 18 months ago. Taking Losartan 50 mg daily for HTN and DM - stable. BP on upper end of normal range today - 138/90. Checks BP intermittently at home on wrist cuff, which shows good BP readings; when she checked on mother's upper arm cuff recently, it was a little high, like today. Taking Lipitor 10 mg daily for HYPERLIPIDEMIA - stable. Last LP on 4/21/21 was at goal, other than low HDL at 32 and slightly high TG's at 170. Admits that she used to walk 6 miles/day for exercise a few years ago, but hasn't done this as much recently. Has h/o iron deficiency anemia - seeing Dr. Laura Willis (Hematology). Anemia was felt to be secondary to menorrhagia. Did not respond to oral iron, so she had 2 iron infusions done last year, most recently one year ago. She will see Dr. Laura Willis in 3/2023 for follow-up. Was diagnosed with carpal tunnel syndrome b/l. Had been going to chiropractor for numbness in her fingers and neck/shoulder pain. She then went to Dr. Caro (Ortho at [facility]) and then had EMG and x-rays ordered. RUE is worse than LUE; does not have sx's daily, but often in \"flares\" for a few days in a row. Pain seems to also be getting a little worse. Has worn a wrist brace at night.   Still sees chiropractor ( Tosin Newsome). Seeing [facility] in [facility]. [facility] for Pap smears - last saw Dr. Earl Jolley, who left the office. Pap is due by 12/2022; last one 12/2017, which was negative with negative HPV. Had seen Dr. Daisy Long (Endocrinology at [facility] in [facility]. [facility]) for DM and h/o hyperthyroidism in pregnancy. Had been started on heart medication originally with the hyperthyroidism and then thyroid medication. When she went off thyroid meds later, she gained approx 20 lbs. Lives at home with  and 2 kids. 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 No h/o tobacco use. Denies recreational drug use. Drinks alcohol occasionally. Weight has been up as high as 190 lbs, but has lost 15 lbs with walking for exercise; wants to try to start walking more.           Past Medical History:   Diagnosis Date    Allergic rhinitis     Anemia     Carpal tunnel syndrome     bilat hands    DM II (diabetes mellitus, type II), controlled ([address]     Gestational diabetes     Hyperlipidemia     Hyperthyroidism affecting pregnancy     during pregnancy only    Incompetent cervix       Past Surgical History:   Procedure Laterality Date    CERVIX SURGERY      cerclage; 1998, 2000, 2007    WISDOM TOOTH EXTRACTION       Family History   Problem Relation Age of Onset    Hypertension Mother     Diabetes Mother     Lung Cancer Father     Hypertension Brother     Colon Cancer Maternal Grandfather         diagnosed in his 63's     Social History     Tobacco Use    Smoking status: Never Smoker    Smokeless tobacco: Never Used   Substance Use Topics    Alcohol use: No      Current Outpatient Medications   Medication Sig Dispense Refill    metFORMIN (GLUCOPHAGE) 500 MG tablet Take 1 tablet by mouth 2 times daily (with meals) 180 tablet 3    atorvastatin (LIPITOR) 10 MG tablet Take 1 tablet by mouth daily 90 tablet 3    losartan (COZAAR) 50 MG tablet take 1 tablet by mouth once daily 30 tablet 3     No current facility-administered medications for this visit. No Known Allergies    Health Maintenance   Topic Date Due    COVID-19 Vaccine (1) Never done    Diabetic retinal exam  Never done    Hepatitis C screen  Never done    Hepatitis B vaccine (1 of 3 - Risk 3-dose series) Never done    Colorectal Cancer Screen  Never done    Pneumococcal 0-64 years Vaccine (2 - PCV) 10/27/2021    A1C test (Diabetic or Prediabetic)  03/10/2022    Diabetic microalbuminuria test  03/10/2022    Lipids  04/21/2022    Flu vaccine (Season Ended) 09/01/2022    Cervical cancer screen  12/21/2022    Diabetic foot exam  06/22/2023    Depression Screen  06/22/2023    DTaP/Tdap/Td vaccine (2 - Td or Tdap) 12/11/2027    HIV screen  Completed    Hepatitis A vaccine  Aged Out    Hib vaccine  Aged Out    Meningococcal (ACWY) vaccine  Aged Out       Subjective:      Review of Systems   Constitutional: Negative for unexpected weight change. Cardiovascular: Negative for leg swelling. Gastrointestinal: Negative for abdominal pain, blood in stool, constipation and diarrhea. Genitourinary: Positive for menstrual problem (menorrhagia with clots). Negative for dysuria and hematuria. Musculoskeletal: Positive for arthralgias (L heel (posterior) - worse with prolonged sitting and then trying to walk; also pain with b/l carpal tunnel syndrome). Neurological: Positive for numbness (fingers in both hands). Objective:     Vitals:    06/22/22 1447 06/22/22 1651   BP: (!) 138/90 (!) 142/88   Site: Right Upper Arm Right Upper Arm   Position: Sitting Sitting   Cuff Size: Medium Adult    Pulse: 88    Temp: 98.4 °F (36.9 °C)    TempSrc: Temporal    SpO2: 99%    Weight: 175 lb 3.2 oz (79.5 kg)    Height: 5' 3\" (1.6 m)      Physical Exam  Vitals and nursing note reviewed. Constitutional:       General: She is not in acute distress. Appearance: She is well-developed.    HENT:      Head: Normocephalic and atraumatic. Right Ear: Tympanic membrane, ear canal and external ear normal.      Left Ear: Tympanic membrane, ear canal and external ear normal.      Nose: Nose normal.      Mouth/Throat:      Mouth: Mucous membranes are moist.      Pharynx: Oropharynx is clear. No posterior oropharyngeal erythema. Eyes:      Conjunctiva/sclera: Conjunctivae normal.   Cardiovascular:      Rate and Rhythm: Normal rate and regular rhythm. Heart sounds: Normal heart sounds. Pulmonary:      Effort: Pulmonary effort is normal. No respiratory distress. Breath sounds: Normal breath sounds. Abdominal:      General: Bowel sounds are normal. There is no distension. Palpations: Abdomen is soft. Tenderness: There is no abdominal tenderness. Musculoskeletal:      Cervical back: Neck supple. Skin:     General: Skin is warm and dry. Neurological:      Mental Status: She is alert and oriented to person, place, and time. Assessment:      1. Controlled type 2 diabetes mellitus without complication, without long-term current use of insulin (HCC)  -     Microalbumin, Ur; Future  -     Hemoglobin A1C; Future  -      DIABETES FOOT EXAM  -     metFORMIN (GLUCOPHAGE) 500 MG tablet; Take 1 tablet by mouth 2 times daily (with meals), Disp-180 tablet, R-3Normal  -     Comprehensive Metabolic Panel; Future  2. Essential hypertension  3. Mixed hyperlipidemia  -     Lipid Panel; Future  -     atorvastatin (LIPITOR) 10 MG tablet; Take 1 tablet by mouth daily, Disp-90 tablet, R-3Normal  4. Other iron deficiency anemia  5. History of hyperthyroidism  -     TSH With Reflex Ft4; Future  6. Encounter for screening mammogram for breast cancer  -     [facility] TYRONE DIGITAL SCREEN BILATERAL; Future  7. Screen for colon cancer  -     Kathrine Andrade DO, General Surgery, [place]         Plan:      Declined Covid vaccine. Return in about 3 months (around 10/3/2022) for f/u DM, HTN, HLD.     Orders Placed This Encounter   Procedures    [facility] TYRONE DIGITAL SCREEN BILATERAL     Standing Status:   Future     Standing Expiration Date:   6/22/2023     Order Specific Question:   Reason for exam:     Answer:   screening for breast cancer    Lipid Panel     Standing Status:   Future     Standing Expiration Date:   6/22/2023     Order Specific Question:   Is Patient Fasting?/# of Hours     Answer:   12    Microalbumin, Ur     Standing Status:   Future     Standing Expiration Date:   6/22/2023    Hemoglobin A1C     Standing Status:   Future     Standing Expiration Date:   6/22/2023    Comprehensive Metabolic Panel     Standing Status:   Future     Standing Expiration Date:   6/22/2023    TSH With Reflex Ft4     Standing Status:   Future     Standing Expiration Date:   6/22/2023   [address] [facility] Maria Del Carmen Esquivel DO, General Surgery, [place]     Referral Priority:   Routine     Referral Type:   Eval and Treat     Referral Reason:   Specialty Services Required     Referred to Provider:   Jen Ann DO     Requested Specialty:   General Surgery     Number of Visits Requested:   1     DIABETES FOOT EXAM     Orders Placed This Encounter   Medications    metFORMIN (GLUCOPHAGE) 500 MG tablet     Sig: Take 1 tablet by mouth 2 times daily (with meals)     Dispense:  180 tablet     Refill:  3    atorvastatin (LIPITOR) 10 MG tablet     Sig: Take 1 tablet by mouth daily     Dispense:  90 tablet     Refill:  3       Patient given educational materials - see patient instructions. Discussed use, benefit, and side effects of prescribed medications. All patient questions answered. Pt voiced understanding. Reviewed health maintenance.             Electronically signed by Vance Stacy DO, DO on 6/30/2022 at 11:26 PM

## 2022-08-23 DIAGNOSIS — I10 ESSENTIAL HYPERTENSION: ICD-10-CM

## 2022-08-24 RX ORDER — LOSARTAN POTASSIUM 50 MG/1
TABLET ORAL
Qty: 30 TABLET | Refills: 3 | OUTPATIENT
Start: 2022-08-24

## 2022-08-30 DIAGNOSIS — I10 ESSENTIAL HYPERTENSION: ICD-10-CM

## 2022-08-30 RX ORDER — LOSARTAN POTASSIUM 50 MG/1
TABLET ORAL
Qty: 30 TABLET | Refills: 3 | OUTPATIENT
Start: 2022-08-30

## 2022-09-01 DIAGNOSIS — I10 ESSENTIAL HYPERTENSION: ICD-10-CM

## 2022-09-01 RX ORDER — LOSARTAN POTASSIUM 50 MG/1
TABLET ORAL
Qty: 30 TABLET | Refills: 0 | Status: SHIPPED | OUTPATIENT
Start: 2022-09-01 | End: 2022-09-23 | Stop reason: SDUPTHER

## 2022-09-01 NOTE — TELEPHONE ENCOUNTER
PCP out of office. Patient is out of medication. LM for patient of lab work to complete.        Nicole Wilson called requesting a refill of the below medication which has been pended for you:     Requested Prescriptions     Pending Prescriptions Disp Refills    losartan (COZAAR) 50 MG tablet 30 tablet 0     Sig: take 1 tablet by mouth once daily       Last Appointment Date: 6/22/2022  Next Appointment Date: 9/23/2022    No Known Allergies

## 2022-09-01 NOTE — TELEPHONE ENCOUNTER
Patient calling and questioning why her prescription for her Losartan 50 mg was denied? She is a Dr. Jeffery Medeiros patient that established in June 2022. She is out of medication and in need of a refill. Her next OV is 9/23/22 for a 3 month follow up. Please return her call at [phone].

## 2022-09-02 ENCOUNTER — TELEPHONE (OUTPATIENT)
Dept: FAMILY MEDICINE CLINIC | Age: 47
End: 2022-09-02

## 2022-09-02 NOTE — TELEPHONE ENCOUNTER
Per med rec, rx written 9/1 by [facility], per dispense report 30 tablets for 30 days dispensed 9/1 at [facility]. Left detailed msg on VM. Advised to call back with any questions.

## 2022-09-02 NOTE — TELEPHONE ENCOUNTER
Pt calling to be sure her Cozaar got to pharmacy, states she's been out since July, also advised pt to do labs and pt states she is doing labs prior to her appt this month.

## 2022-09-19 LAB
ALBUMIN SERPL-MCNC: 4.6 G/DL
ALP BLD-CCNC: 65 U/L
ALT SERPL-CCNC: 17 U/L
ANION GAP SERPL CALCULATED.3IONS-SCNC: 9 MMOL/L
AST SERPL-CCNC: 15 U/L
AVERAGE GLUCOSE: 160
BILIRUB SERPL-MCNC: 0.3 MG/DL (ref 0.1–1.4)
BUN BLDV-MCNC: 11 MG/DL
CALCIUM SERPL-MCNC: 9.3 MG/DL
CHLORIDE BLD-SCNC: 103 MMOL/L
CHOLESTEROL, TOTAL: 172 MG/DL
CHOLESTEROL/HDL RATIO: 4.5
CO2: 28 MMOL/L
CREAT SERPL-MCNC: 0.68 MG/DL
CREATININE URINE: NORMAL
GFR CALCULATED: 109
GLUCOSE BLD-MCNC: 128 MG/DL
HBA1C MFR BLD: 7.2 %
HDLC SERPL-MCNC: 38 MG/DL (ref 35–70)
LDL CHOLESTEROL CALCULATED: 83 MG/DL (ref 0–160)
MICROALBUMIN/CREAT 24H UR: <12 MG/G{CREAT}
NONHDLC SERPL-MCNC: 2.2 MG/DL
POTASSIUM SERPL-SCNC: 4.1 MMOL/L
SODIUM BLD-SCNC: 140 MMOL/L
TOTAL PROTEIN: 7
TRIGL SERPL-MCNC: 256 MG/DL
TSH SERPL DL<=0.05 MIU/L-ACNC: 2.53 UIU/ML
VLDLC SERPL CALC-MCNC: 51 MG/DL

## 2022-09-21 DIAGNOSIS — Z86.39 HISTORY OF HYPERTHYROIDISM: ICD-10-CM

## 2022-09-21 DIAGNOSIS — E11.9 CONTROLLED TYPE 2 DIABETES MELLITUS WITHOUT COMPLICATION, WITHOUT LONG-TERM CURRENT USE OF INSULIN (HCC): ICD-10-CM

## 2022-09-21 DIAGNOSIS — E78.2 MIXED HYPERLIPIDEMIA: ICD-10-CM

## 2022-09-21 DIAGNOSIS — Z12.31 ENCOUNTER FOR SCREENING MAMMOGRAM FOR BREAST CANCER: ICD-10-CM

## 2022-09-22 DIAGNOSIS — I10 ESSENTIAL HYPERTENSION: ICD-10-CM

## 2022-09-23 ENCOUNTER — HOSPITAL ENCOUNTER (OUTPATIENT)
Dept: MAMMOGRAPHY | Age: 47
Discharge: HOME | End: 2022-09-25
Payer: COMMERCIAL

## 2022-09-23 ENCOUNTER — OFFICE VISIT (OUTPATIENT)
Dept: FAMILY MEDICINE CLINIC | Age: 47
End: 2022-09-23
Payer: COMMERCIAL

## 2022-09-23 VITALS
WEIGHT: 178 LBS | HEIGHT: 63 IN | TEMPERATURE: 98.2 F | DIASTOLIC BLOOD PRESSURE: 78 MMHG | HEART RATE: 87 BPM | SYSTOLIC BLOOD PRESSURE: 122 MMHG | BODY MASS INDEX: 31.54 KG/M2 | OXYGEN SATURATION: 98 %

## 2022-09-23 DIAGNOSIS — E11.9 CONTROLLED TYPE 2 DIABETES MELLITUS WITHOUT COMPLICATION, WITHOUT LONG-TERM CURRENT USE OF INSULIN (HCC): Primary | ICD-10-CM

## 2022-09-23 DIAGNOSIS — D50.8 OTHER IRON DEFICIENCY ANEMIA: ICD-10-CM

## 2022-09-23 DIAGNOSIS — I10 ESSENTIAL HYPERTENSION: ICD-10-CM

## 2022-09-23 DIAGNOSIS — E78.2 HYPERCHOLESTEROLEMIA WITH HYPERTRIGLYCERIDEMIA: ICD-10-CM

## 2022-09-23 PROCEDURE — 3051F HG A1C>EQUAL 7.0%<8.0%: CPT | Performed by: FAMILY MEDICINE

## 2022-09-23 PROCEDURE — 77063 BREAST TOMOSYNTHESIS BI: CPT

## 2022-09-23 PROCEDURE — 99214 OFFICE O/P EST MOD 30 MIN: CPT | Performed by: FAMILY MEDICINE

## 2022-09-23 RX ORDER — FENOFIBRATE 54 MG/1
54 TABLET ORAL DAILY
Qty: 90 TABLET | Refills: 1 | Status: SHIPPED | OUTPATIENT
Start: 2022-09-23

## 2022-09-23 RX ORDER — LOSARTAN POTASSIUM 50 MG/1
50 TABLET ORAL DAILY
Qty: 90 TABLET | Refills: 3 | Status: SHIPPED | OUTPATIENT
Start: 2022-09-23

## 2022-09-23 NOTE — PROGRESS NOTES
MARILOU Ber[address]  [address], [address]  [phone]      Kami Pringle is a 56 y.o. female who presents today for her medical conditions/complaints as noted below. Kami Pringle is c/o of Diabetes, Hypertension, and Hyperlipidemia      HPI:     Pt here today for follow-up of DM and HYPERLIPIDEMIA. Taking Lipitor 10 mg daily for HYPERLIPIDEMIA - stable. Recent lab results from 9/19 show TG's worsened from 170 to 256. Taking Metformin 500 mg BID for DM - stable. Checking glucose at home \"once in awhile\" - fasting was running in the 140's, but then it came down to 120-130's. Will be going to new eye doctor in [facility].    Pt admits that her physical activity had been lower x past few months; just re-started going to exercise classes (cardio drumming, [place]) 2 nights/week. Will be scheduling an appt soon with Hematology (Dr. Luli Cormier) to see them in the spring; will do labs prior to that appt. Taking Losartan 50 mg daily for HTN and DM - stable. BP well-controlled today - 122/78. Has been having more sx's of carpal tunnel with cardio drumming in both hands; started wearing wrist braces again nightly.         Past Medical History:   Diagnosis Date    Allergic rhinitis     Anemia     Carpal tunnel syndrome     bilat hands    DM II (diabetes mellitus, type II), controlled ([address]     Gestational diabetes     Hyperlipidemia     Hyperthyroidism affecting pregnancy     during pregnancy only    Incompetent cervix       Past Surgical History:   Procedure Laterality Date    CERVIX SURGERY      cerclage; 1998, 2000, 2007    WISDOM TOOTH EXTRACTION       Family History   Problem Relation Age of Onset    Hypertension Mother     Diabetes Mother     Osteoporosis Mother     Lung Cancer Father     Hypertension Brother     Colon Cancer Maternal Grandfather         diagnosed in his 63's     Social History     Tobacco Use    Smoking status: Never Smokeless tobacco: Never   Substance Use Topics    Alcohol use: No      Current Outpatient Medications   Medication Sig Dispense Refill    losartan (COZAAR) 50 MG tablet Take 1 tablet by mouth daily 90 tablet 3    fenofibrate (TRICOR) 54 MG tablet Take 1 tablet by mouth daily 90 tablet 1    metFORMIN (GLUCOPHAGE) 500 MG tablet Take 1 tablet by mouth 2 times daily (with meals) 180 tablet 3    atorvastatin (LIPITOR) 10 MG tablet Take 1 tablet by mouth daily 90 tablet 3     No current facility-administered medications for this visit. No Known Allergies    Health Maintenance   Topic Date Due    COVID-19 Vaccine (1) Never done    Diabetic retinal exam  Never done    Hepatitis C screen  Never done    Colorectal Cancer Screen  Never done    Flu vaccine (1) Never done    Cervical cancer screen  12/21/2022    Diabetic foot exam  06/22/2023    Depression Screen  06/22/2023    A1C test (Diabetic or Prediabetic)  09/19/2023    Diabetic microalbuminuria test  09/19/2023    Lipids  09/19/2023    DTaP/Tdap/Td vaccine (2 - Td or Tdap) 12/11/2027    Pneumococcal 0-64 years Vaccine  Completed    HIV screen  Completed    Hepatitis A vaccine  Aged Out    Hepatitis B vaccine  Aged Out    Hib vaccine  Aged Out    Meningococcal (ACWY) vaccine  Aged Out       Subjective:      Review of Systems   Constitutional:  Negative for unexpected weight change. Cardiovascular:  Negative for chest pain. Musculoskeletal:  Positive for arthralgias (arm/wrist pains due to carpal tunnel). Neurological:  Positive for numbness (fingers in both hands - 1st-4th digits). Negative for dizziness and light-headedness. Objective:     Vitals:    09/23/22 1503   BP: 122/78   Site: Right Upper Arm   Position: Sitting   Cuff Size: Large Adult   Pulse: 87   Temp: 98.2 °F (36.8 °C)   TempSrc: Temporal   SpO2: 98%   Weight: 178 lb (80.7 kg)   Height: 5' 3\" (1.6 m)     Physical Exam  Constitutional:       General: She is not in acute distress. Appearance: Normal appearance. HENT:      Head: Normocephalic and atraumatic. Eyes:      Conjunctiva/sclera: Conjunctivae normal.   Cardiovascular:      Rate and Rhythm: Normal rate and regular rhythm. Heart sounds: Normal heart sounds. Pulmonary:      Effort: Pulmonary effort is normal. No respiratory distress. Breath sounds: Normal breath sounds. Abdominal:      General: Bowel sounds are normal. There is no distension. Palpations: Abdomen is soft. Tenderness: There is no abdominal tenderness. Musculoskeletal:      Right lower leg: No edema. Left lower leg: No edema. Skin:     General: Skin is warm and dry. Neurological:      General: No focal deficit present. Mental Status: She is alert and oriented to person, place, and time. Psychiatric:         Mood and Affect: Mood normal.       Assessment:      1. Controlled type 2 diabetes mellitus without complication, without long-term current use of insulin (HCC)  -     Hemoglobin A1C; Future  2. Hypercholesterolemia with hypertriglyceridemia  -     fenofibrate (TRICOR) 54 MG tablet; Take 1 tablet by mouth daily, Disp-90 tablet, R-1Normal  -     Lipid Panel; Future  3. Essential hypertension  -     losartan (COZAAR) 50 MG tablet; Take 1 tablet by mouth daily, Disp-90 tablet, R-3Normal  4. Other iron deficiency anemia         Plan:      Return in about 6 months (around 3/23/2023) for f/u DM, HLD, labs.     Orders Placed This Encounter   Procedures    Hemoglobin A1C     Standing Status:   Future     Standing Expiration Date:   9/23/2023    Lipid Panel     Standing Status:   Future     Standing Expiration Date:   9/23/2023     Order Specific Question:   Is Patient Fasting?/# of Hours     Answer:   12     Orders Placed This Encounter   Medications    losartan (COZAAR) 50 MG tablet     Sig: Take 1 tablet by mouth daily     Dispense:  90 tablet     Refill:  3    fenofibrate (TRICOR) 54 MG tablet     Sig: Take 1 tablet by mouth daily Dispense:  90 tablet     Refill:  1       Patient given educational materials - see patient instructions. Discussed use, benefit, and side effects of prescribed medications. All patient questions answered. Pt voiced understanding. Reviewed health maintenance.             Electronically signed by Natalie Nicolas DO, DO on 9/30/2022 at 7:09 PM

## 2022-09-23 NOTE — TELEPHONE ENCOUNTER
Pt has appt today with [facility].
Alert and oriented to person, place and time, memory intact, behavior appropriate to situation, PERRL.

## 2022-09-26 RX ORDER — LOSARTAN POTASSIUM 50 MG/1
TABLET ORAL
Qty: 30 TABLET | Refills: 0 | OUTPATIENT
Start: 2022-09-26

## 2022-11-22 NOTE — PROGRESS NOTES
Subjective:      Patient ID: Raj Andrade is a 40 y.o. female. Visit Information    Have you changed or started any medications since your last visit including any over-the-counter medicines, vitamins, or herbal medicines? no   Have you stopped taking any of your medications? Is so, why? -  no  Are you having any side effects from any of your medications? - no    Have you seen any other physician or provider since your last visit? yes - OBGYN Dr. Rosalinda Nash  Have you had any other diagnostic tests since your last visit? Yes A1c   Have you been seen in the emergency room and/or had an admission in a hospital since we last saw you?  no   Have you had your routine dental cleaning in the past 6 months?  yes - routine     Do you have an active MyChart account? If no, what is the barrier? Yes    Patient Care Team:  Von Dawson MD as PCP - General (Family Medicine)    Medical History Review  Past Medical, Family, and Social History reviewed and does contribute to the patient presenting condition    Health Maintenance   Topic Date Due    HIV screen  01/08/1991    DTaP/Tdap/Td vaccine (1 - Tdap) 01/08/1995    Cervical cancer screen  01/08/1997    Lipid screen  01/08/2016    Flu vaccine (1) 09/01/2017           Patient presents in office today to establish care. Lives [place] [address] [facility]. Has not had PCP since she was younger in [facility]. Had gestational diabetes about 9 years ago with last pregnancy. Has been experiencing night sweats for last year. Started checking her sugars and noticed sweats were not as bad when she ate better. Has a skin thing under her right breast. Started beginning of September. Went to [facility] for check. Had not been there in about 8 years. Given nystatin powder. Powder didn't help much. Would also feel like she would have regular yeast infections.  Started treating symptoms herself at home with soap her daughter got that she needed to use before she had received counseling on the following healthy behaviors: nutrition, exercise and medication adherence  2. Reviewed prior labs and health maintenance  3. Continue current medications, diet and exercise. 4.  Discussed use, benefit, and side effects of prescribed medications. Barriers to medication compliance addressed. All patient questions answered. Pt voiced understanding. 5.  Patient given educational materials - see patient instructions  6. Was a self-tracking handout given in paper form or via [employer]? No  If yes, see orders or list here.     PHQ Scores 12/11/2017   PHQ2 Score 3   PHQ9 Score 3     Interpretation of Total Score Depression Severity: 1-4 = Minimal depression, 5-9 = Mild depression, 10-14 = Moderate depression, 15-19 = Moderately severe depression, 20-27 = Severe depression  mild depressed mood, will follow    Completed Refills   Requested Prescriptions      No prescriptions requested or ordered in this encounter Neuro

## 2023-01-30 DIAGNOSIS — D50.8 IRON DEFICIENCY ANEMIA SECONDARY TO INADEQUATE DIETARY IRON INTAKE: Primary | ICD-10-CM

## 2023-03-01 ENCOUNTER — OFFICE VISIT (OUTPATIENT)
Dept: FAMILY MEDICINE CLINIC | Age: 48
End: 2023-03-01

## 2023-03-01 VITALS
DIASTOLIC BLOOD PRESSURE: 86 MMHG | SYSTOLIC BLOOD PRESSURE: 124 MMHG | HEIGHT: 63 IN | HEART RATE: 88 BPM | WEIGHT: 178 LBS | BODY MASS INDEX: 31.54 KG/M2 | TEMPERATURE: 98.1 F | OXYGEN SATURATION: 98 %

## 2023-03-01 DIAGNOSIS — Z12.31 ENCOUNTER FOR SCREENING MAMMOGRAM FOR BREAST CANCER: ICD-10-CM

## 2023-03-01 DIAGNOSIS — I10 ESSENTIAL HYPERTENSION: ICD-10-CM

## 2023-03-01 DIAGNOSIS — E11.9 CONTROLLED TYPE 2 DIABETES MELLITUS WITHOUT COMPLICATION, WITHOUT LONG-TERM CURRENT USE OF INSULIN (HCC): ICD-10-CM

## 2023-03-01 DIAGNOSIS — E78.2 HYPERCHOLESTEROLEMIA WITH HYPERTRIGLYCERIDEMIA: Primary | ICD-10-CM

## 2023-03-01 RX ORDER — FENOFIBRATE 54 MG/1
54 TABLET ORAL DAILY
Qty: 90 TABLET | Refills: 1 | Status: SHIPPED | OUTPATIENT
Start: 2023-03-01

## 2023-03-01 RX ORDER — EZETIMIBE 10 MG/1
10 TABLET ORAL DAILY
Qty: 90 TABLET | Refills: 1 | Status: SHIPPED | OUTPATIENT
Start: 2023-03-01

## 2023-03-01 SDOH — ECONOMIC STABILITY: HOUSING INSECURITY
IN THE LAST 12 MONTHS, WAS THERE A TIME WHEN YOU DID NOT HAVE A STEADY PLACE TO SLEEP OR SLEPT IN A SHELTER (INCLUDING NOW)?: NO

## 2023-03-01 SDOH — ECONOMIC STABILITY: FOOD INSECURITY: WITHIN THE PAST 12 MONTHS, THE FOOD YOU BOUGHT JUST DIDN'T LAST AND YOU DIDN'T HAVE MONEY TO GET MORE.: NEVER TRUE

## 2023-03-01 SDOH — ECONOMIC STABILITY: FOOD INSECURITY: WITHIN THE PAST 12 MONTHS, YOU WORRIED THAT YOUR FOOD WOULD RUN OUT BEFORE YOU GOT MONEY TO BUY MORE.: NEVER TRUE

## 2023-03-01 SDOH — ECONOMIC STABILITY: INCOME INSECURITY: HOW HARD IS IT FOR YOU TO PAY FOR THE VERY BASICS LIKE FOOD, HOUSING, MEDICAL CARE, AND HEATING?: NOT HARD AT ALL

## 2023-03-01 ASSESSMENT — PATIENT HEALTH QUESTIONNAIRE - PHQ9
SUM OF ALL RESPONSES TO PHQ QUESTIONS 1-9: 0
SUM OF ALL RESPONSES TO PHQ9 QUESTIONS 1 & 2: 0
1. LITTLE INTEREST OR PLEASURE IN DOING THINGS: 0
SUM OF ALL RESPONSES TO PHQ QUESTIONS 1-9: 0
SUM OF ALL RESPONSES TO PHQ QUESTIONS 1-9: 0
2. FEELING DOWN, DEPRESSED OR HOPELESS: 0
SUM OF ALL RESPONSES TO PHQ QUESTIONS 1-9: 0

## 2023-03-01 ASSESSMENT — ENCOUNTER SYMPTOMS: BLOOD IN STOOL: 0

## 2023-03-01 NOTE — PROGRESS NOTES
MARILOU [address]  [address], [address]  [phone]      Lyn Esposito is a 53 y.o. female who presents today for her medical conditions/complaints as noted below. Lyn Esposito is c/o of Diabetes      HPI:     Pt here today for follow-up of DM. Reviewed recent lab results with pt today. Thinks the Lipitor is causing her achiness, mostly in her lower extremities. Was having this even before Fenofibrate was added at last OV. Taking Lipitor 10 mg daily and Fenofibrate 54 mg daily for HYPERLIPIDEMIA - stable. Recent lab results from 9/19 show TG's worsened from 170 to 256. Taking Metformin 500 mg BID for DM - stable. Checking glucose at home \"once in awhile\" - fasting usually 120-140's; one AM as low as 90's. Will be going to new eye doctor in [facility] for DM eye exam.     Seeing Dr. Devan Sheets in the next 2 weeks for her next appt; just had labs drawn for him. Taking Losartan 50 mg daily for HTN and DM - stable. BP well-controlled today - 124/86. Has been having more sx's of carpal tunnel with cardio drumming in both hands; started wearing wrist braces again nightly.           Past Medical History:   Diagnosis Date    Allergic rhinitis     Anemia     Carpal tunnel syndrome     bilat hands    DM II (diabetes mellitus, type II), controlled ([address]     Gestational diabetes     Hyperlipidemia     Hyperthyroidism affecting pregnancy     during pregnancy only    Incompetent cervix       Past Surgical History:   Procedure Laterality Date    CERVIX SURGERY      cerclage; 1998, 2000, 2007    WISDOM TOOTH EXTRACTION       Family History   Problem Relation Age of Onset    Hypertension Mother     Diabetes Mother     Osteoporosis Mother     Lung Cancer Father     Hypertension Brother     Colon Cancer Maternal Grandfather         diagnosed in his 63's     Social History     Tobacco Use    Smoking status: Never    Smokeless tobacco: Never Substance Use Topics    Alcohol use: No      Current Outpatient Medications   Medication Sig Dispense Refill    fenofibrate (TRICOR) 54 MG tablet Take 1 tablet by mouth daily 90 tablet 1    ezetimibe (ZETIA) 10 MG tablet Take 1 tablet by mouth daily 90 tablet 1    losartan (COZAAR) 50 MG tablet Take 1 tablet by mouth daily 90 tablet 3    metFORMIN (GLUCOPHAGE) 500 MG tablet Take 1 tablet by mouth 2 times daily (with meals) 180 tablet 3     No current facility-administered medications for this visit. No Known Allergies    Health Maintenance   Topic Date Due    COVID-19 Vaccine (1) Never done    Diabetic retinal exam  Never done    Hepatitis C screen  Never done    Hepatitis B vaccine (1 of 3 - Risk 3-dose series) Never done    Colorectal Cancer Screen  Never done    Flu vaccine (1) Never done    Cervical cancer screen  12/21/2022    Diabetic foot exam  06/22/2023    Diabetic Alb to Cr ratio (uACR) test  09/19/2023    GFR test (Diabetes, CKD 3-4, OR last GFR 15-59)  09/19/2023    A1C test (Diabetic or Prediabetic)  02/13/2024    Lipids  02/13/2024    Depression Screen  03/01/2024    DTaP/Tdap/Td vaccine (2 - Td or Tdap) 12/11/2027    Pneumococcal 0-64 years Vaccine  Completed    HIV screen  Completed    Hepatitis A vaccine  Aged Out    Hib vaccine  Aged Out    Meningococcal (ACWY) vaccine  Aged Out       Subjective:      Review of Systems   Constitutional:  Negative for unexpected weight change. Cardiovascular:  Negative for chest pain and palpitations. Gastrointestinal:  Negative for blood in stool. Genitourinary:  Negative for hematuria. Neurological:  Negative for dizziness and light-headedness.      Objective:     Vitals:    03/01/23 1202   BP: 124/86   Site: Right Upper Arm   Position: Sitting   Cuff Size: Large Adult   Pulse: 88   Temp: 98.1 °F (36.7 °C)   TempSrc: Temporal   SpO2: 98%   Weight: 178 lb (80.7 kg)   Height: 5' 3\" (1.6 m)     Physical Exam  Constitutional:       General: She is not in acute distress. Appearance: Normal appearance. HENT:      Head: Normocephalic and atraumatic. Eyes:      Conjunctiva/sclera: Conjunctivae normal.   Cardiovascular:      Rate and Rhythm: Normal rate and regular rhythm. Heart sounds: Normal heart sounds. Pulmonary:      Effort: Pulmonary effort is normal. No respiratory distress. Breath sounds: Normal breath sounds. Abdominal:      General: Bowel sounds are normal. There is no distension. Palpations: Abdomen is soft. Tenderness: There is no abdominal tenderness. Musculoskeletal:      Right lower leg: No edema. Left lower leg: No edema. Skin:     General: Skin is warm and dry. Neurological:      General: No focal deficit present. Mental Status: She is alert and oriented to person, place, and time. Psychiatric:         Mood and Affect: Mood normal.       Assessment:      1. Hypercholesterolemia with hypertriglyceridemia  -     fenofibrate (TRICOR) 54 MG tablet; Take 1 tablet by mouth daily, Disp-90 tablet, R-1Normal  -     Lipid Panel; Future  -     ezetimibe (ZETIA) 10 MG tablet; Take 1 tablet by mouth daily, Disp-90 tablet, R-1Normal  2. Controlled type 2 diabetes mellitus without complication, without long-term current use of insulin (HCC)  -     Hemoglobin A1C; Future  -     Microalbumin, Ur; Future  3. Essential hypertension  -     Comprehensive Metabolic Panel; Future  4. Encounter for screening mammogram for breast cancer  -     FLOWER TYRONE DIGITAL SCREEN BILATERAL; Future         Plan:      Return in about 6 months (around 9/1/2023) for f/u DM, HTN, HLD, labs.     Orders Placed This Encounter   Procedures    FLOWER TYRONE DIGITAL SCREEN BILATERAL     Standing Status:   Future     Standing Expiration Date:   5/1/2024     Order Specific Question:   Reason for exam:     Answer:   screening for breast cancer    Hemoglobin A1C     Standing Status:   Future     Standing Expiration Date:   3/1/2024    Lipid Panel     Standing Status:   Future     Standing Expiration Date:   3/1/2024     Order Specific Question:   Is Patient Fasting?/# of Hours     Answer:   15     Order Specific Question:   Has the patient fasted? Answer:   Yes    Comprehensive Metabolic Panel     Standing Status:   Future     Standing Expiration Date:   3/1/2024    Microalbumin, Ur     Standing Status:   Future     Standing Expiration Date:   3/1/2024     Orders Placed This Encounter   Medications    fenofibrate (TRICOR) 54 MG tablet     Sig: Take 1 tablet by mouth daily     Dispense:  90 tablet     Refill:  1    ezetimibe (ZETIA) 10 MG tablet     Sig: Take 1 tablet by mouth daily     Dispense:  90 tablet     Refill:  1       Patient given educational materials - see patient instructions. Discussed use, benefit, and side effects of prescribed medications. All patient questions answered. Pt voiced understanding. Reviewed health maintenance.             Electronically signed by Hanny Villalta DO, DO on 3/12/2023 at 11:48 PM

## 2023-03-06 ENCOUNTER — TELEPHONE (OUTPATIENT)
Dept: ONCOLOGY | Age: 48
End: 2023-03-06

## 2023-03-06 ENCOUNTER — HOSPITAL ENCOUNTER (OUTPATIENT)
Age: 48
End: 2023-03-06

## 2023-03-06 ENCOUNTER — OFFICE VISIT (OUTPATIENT)
Dept: ONCOLOGY | Age: 48
End: 2023-03-06
Payer: COMMERCIAL

## 2023-03-06 VITALS
TEMPERATURE: 97.4 F | WEIGHT: 178 LBS | DIASTOLIC BLOOD PRESSURE: 89 MMHG | BODY MASS INDEX: 31.53 KG/M2 | HEART RATE: 76 BPM | SYSTOLIC BLOOD PRESSURE: 152 MMHG

## 2023-03-06 DIAGNOSIS — D50.8 IRON DEFICIENCY ANEMIA SECONDARY TO INADEQUATE DIETARY IRON INTAKE: ICD-10-CM

## 2023-03-06 DIAGNOSIS — K90.9 IRON MALABSORPTION: ICD-10-CM

## 2023-03-06 DIAGNOSIS — D64.9 NORMOCYTIC ANEMIA: Primary | ICD-10-CM

## 2023-03-06 PROCEDURE — 99214 OFFICE O/P EST MOD 30 MIN: CPT | Performed by: INTERNAL MEDICINE

## 2023-03-06 PROCEDURE — 99211 OFF/OP EST MAY X REQ PHY/QHP: CPT | Performed by: INTERNAL MEDICINE

## 2023-03-06 NOTE — PROGRESS NOTES
_        Chief Complaint   Patient presents with    Follow-up     Review status of disease    Discuss Labs     DIAGNOSIS:       Partially treated iron deficiency anemia  Probable malabsorption  Menorrhagia    CURRENT THERAPY:         IV iron infusion June 2021     BRIEF CASE HISTORY:      Ms. Birdie Ortiz is a very pleasant 53 y.o. female with history of multiple co morbidities as listed. Patient is referred for further evaluation of anemia. The patient states that about 2 to 3 years ago she had blood work which revealed iron deficiency. She was started on oral iron 1 tablet daily and due to lack of response was made twice daily. Patient was having problems with weakness and fatigue and feeling tired. Symptoms were persistent and recently were getting worse. Rare dizziness. Occasional palpitation. Patient has history of heavy menses with passage of clots. No other source of bleeding. No melena or hematochezia. No hematemesis. No hematuria. Patient denies any weight loss or decreased appetite. No fever or night sweats. No other complaints. Patient denies smoking. Rare social alcohol drinking. .   INTERIM HISTORY:   Seen for follow up iron deficiency anemia. S/p iv iron infusion. Felt better. Less weakness and fatigue. No active bleeding. No other complaints. PAST MEDICAL HISTORY: has a past medical history of Allergic rhinitis, Anemia, Carpal tunnel syndrome, DM II (diabetes mellitus, type II), controlled ([address] Gestational diabetes, Hyperlipidemia, Hyperthyroidism affecting pregnancy, and Incompetent cervix. PAST SURGICAL HISTORY: has a past surgical history that includes Cervix surgery and [place] tooth extraction. CURRENT MEDICATIONS:  has a current medication list which includes the following prescription(s): fenofibrate, ezetimibe, losartan, and metformin.     ALLERGIES:  has No Known Allergies. FAMILY HISTORY: Grandfather had lung cancer. Otherwise negative for any hematological or oncological conditions. SOCIAL HISTORY:  reports that she has never smoked. She has never used smokeless tobacco. She reports that she does not drink alcohol and does not use drugs. REVIEW OF SYSTEMS:     General: Positive for weakness and fatigue. No unanticipated weight loss or decreased appetite. No fever or chills. Eyes: No blurred vision, eye pain or double vision. Ears: No hearing problems or drainage. No tinnitus. Throat: No sore throat, problems with swallowing or dysphagia. Respiratory: No cough, sputum or hemoptysis. No shortness of breath. No pleuritic chest pain. Cardiovascular: No chest pain, orthopnea or PND. No lower extremity edema. No palpitation. Gastrointestinal: No problems with swallowing. No abdominal pain or bloating. No nausea or vomiting. No diarrhea or constipation. No GI bleeding. Genitourinary: No dysuria, hematuria, frequency or urgency. Musculoskeletal: No muscle aches or pains. No limitation of movement. No back pain. No gait disturbance, No joint complaints. Dermatologic: No skin rashes or pruritus. No skin lesions or discolorations. Psychiatric: No depression, anxiety, or stress or signs of schizophrenia. No change in mood or affect. Hematologic: No history of bleeding tendency. No bruises or ecchymosis. No history of clotting problems. Infectious disease: No fever, chills or frequent infections. Endocrine: No polydipsia or polyuria. No temperature intolerance. Neurologic: No headaches or dizziness. No weakness or numbness of the extremities. No changes in balance, coordination,  memory, mentation, behavior. Allergic/Immunologic: No nasal congestion or hives. No repeated infections. PHYSICAL EXAM:  The patient is not in acute distress.   Vital signs: Blood pressure (!) 152/89, pulse 76, temperature 97.4 °F (36.3 °C), temperature source Temporal, weight 178 lb (80.7 kg), not currently breastfeeding. General appearance - well appearing, not in pain or distress  Mental status - good mood, alert and oriented  Eyes - pupils equal and reactive, extraocular eye movements intact  Ears - bilateral TM's and external ear canals normal  Nose - normal and patent, no erythema, discharge or polyps  Mouth - mucous membranes moist, pharynx normal without lesions  Neck - supple, no significant adenopathy  Lymphatics - no palpable lymphadenopathy, no hepatosplenomegaly  Chest - clear to auscultation, no wheezes, rales or rhonchi, symmetric air entry  Heart - normal rate, regular rhythm, normal S1, S2, no murmurs, rubs, clicks or gallops  Abdomen - soft, nontender, nondistended, no masses or organomegaly  Neurological - alert, oriented, normal speech, no focal findings or movement disorder noted  Musculoskeletal - no joint tenderness, deformity or swelling  Extremities - peripheral pulses normal, no pedal edema, no clubbing or cyanosis  Skin - normal coloration and turgor, no rashes, no suspicious skin lesions noted     Review of Diagnostic data:   Lab Results   Component Value Date    WBC 5.8 04/21/2021    HGB 11.6 (L) 04/21/2021    HCT 37.4 04/21/2021    MCV 84.2 04/21/2021     04/21/2021       Chemistry        Component Value Date/Time     09/19/2022 0000    K 4.1 09/19/2022 0000     09/19/2022 0000    CO2 28 09/19/2022 0000    BUN 11 09/19/2022 0000    CREATININE 0.68 09/19/2022 0000        Component Value Date/Time    CALCIUM 9.3 09/19/2022 0000    ALKPHOS 65 09/19/2022 0000    AST 15 09/19/2022 0000    ALT 17 09/19/2022 0000    BILITOT 0.3 09/19/2022 0000        Lab Results   Component Value Date    IRON 46 11/09/2018    TIBC 430 (H) 08/05/2020    FERRITIN 28 04/21/2021         IMPRESSION:   Partially treated iron deficiency anemia  Probable malabsorption  Menorrhagia      PLAN: I reviewed the labs as above and discussed with the patient. I explained to the patient the nature of this hematologic problem. I explained the significance of these abnormalities in layman language. Reviewing patient's labs obviously she had evidence of iron deficiency anemia. She had mild response in the past but recently started getting worse again. She was given IV iron infusion. Repeated  Labs showed very good response. Iron studies and vitamin B12 and folate were all normal.  Will repeat labs in 12 months. Patient's questions were answered to the best of her satisfaction and she verbalized full understanding and agreement.

## 2023-03-06 NOTE — TELEPHONE ENCOUNTER
AVS from 3/6/23      Please provide patient with a copy of labs from path Lab  RV 12 months with CBC, Iron studies at [facility] were printed    Rv scheduled for 3/4/24 @ 11:00 am     Pt was given AVS and appointment schedule    Electronically signed by Otilio Severs on 3/6/2023 at 11:10 AM

## 2023-06-06 ENCOUNTER — OFFICE VISIT (OUTPATIENT)
Dept: PODIATRY | Age: 48
End: 2023-06-06
Payer: COMMERCIAL

## 2023-06-06 VITALS
RESPIRATION RATE: 20 BRPM | HEART RATE: 80 BPM | HEIGHT: 64 IN | DIASTOLIC BLOOD PRESSURE: 80 MMHG | BODY MASS INDEX: 31.1 KG/M2 | WEIGHT: 182.2 LBS | SYSTOLIC BLOOD PRESSURE: 142 MMHG

## 2023-06-06 DIAGNOSIS — M79.671 FOOT PAIN, BILATERAL: ICD-10-CM

## 2023-06-06 DIAGNOSIS — M76.61 ACHILLES TENDINITIS OF BOTH LOWER EXTREMITIES: Primary | ICD-10-CM

## 2023-06-06 DIAGNOSIS — M79.672 FOOT PAIN, BILATERAL: ICD-10-CM

## 2023-06-06 DIAGNOSIS — M76.62 ACHILLES TENDINITIS OF BOTH LOWER EXTREMITIES: Primary | ICD-10-CM

## 2023-06-06 PROCEDURE — 99203 OFFICE O/P NEW LOW 30 MIN: CPT | Performed by: PODIATRIST

## 2023-06-06 RX ORDER — METHYLPREDNISOLONE 4 MG/1
TABLET ORAL
Qty: 1 KIT | Refills: 0 | Status: SHIPPED | OUTPATIENT
Start: 2023-06-06

## 2023-06-06 NOTE — PROGRESS NOTES
Vitals:    06/06/23 1556   BP: (!) 142/80   Pulse:    Resp:      General: AAO x 3 in NAD. Vascular: DP and PT pulses palpable 2/4, bilateral.  CFT <3 seconds, bilateral.  Hair growth present to the level of the digits, bilateral.  Edema absent, bilateral.  Varicosities absent, bilateral. Erythema absent, bilateral. Distal Rubor absent bilateral.  Temperature within normal limits bilateral. Hyperpigmentation absent bilateral. No atrophic skin. Neurological: Sensation intact to light touch to level of digits, bilateral.  Protective sensation intact 10/10 sites via 5.07/10g [place] Monofilament, bilateral.  negative Tinel's, bilateral.  negative Valleix sign, bilateral.  Vibratory intact bilateral.  Reflexes Decreased bilateral.  Paresthesias negative. Dysthesias negative. Sharp/dull intact bilateral.    Musculoskeletal: Muscle strength 5/5, Bilateral.  Pain with strength testing is absent. Pain present upon palpation of distal achilles insertijon L>R,.  within normal limits medial longitudinal arch, Bilateral.  Ankle ROM decreased,Bilateral.  1st MPJ ROM within normal limits, Bilateral.  Dorsally contracted digits absent digits none, Bilateral. Other foot deformities pronation with WB. Integument: Warm, dry, supple, bilateral.  Open lesion absent, bilateral.  Interdigital maceration absent to web spaces bilateral.  Nails within normal limits. Fissures absent, bilateral. Hyperkeratotic tissue is absent. Asessment: Patient is a 53 y.o. female with:    Diagnosis Orders   1. Achilles tendinitis of both lower extremities        2. Foot pain, bilateral            Plan: Patient examined and evaluated. Current condition and treatment options discussed in detail. Achilles tendonitis Treatment    1. Stretching - 3 times per day minimum for 5-10 minutes. Include stretching against a wall or counter with one foot back and heel flat,    Leaning into a step, or using a towel or belt.    Be active

## 2023-06-29 ENCOUNTER — OFFICE VISIT (OUTPATIENT)
Dept: PODIATRY | Age: 48
End: 2023-06-29
Payer: COMMERCIAL

## 2023-06-29 VITALS
WEIGHT: 179 LBS | BODY MASS INDEX: 30.56 KG/M2 | SYSTOLIC BLOOD PRESSURE: 136 MMHG | DIASTOLIC BLOOD PRESSURE: 82 MMHG | HEART RATE: 76 BPM | HEIGHT: 64 IN

## 2023-06-29 DIAGNOSIS — M79.672 FOOT PAIN, BILATERAL: ICD-10-CM

## 2023-06-29 DIAGNOSIS — M76.61 ACHILLES TENDINITIS OF BOTH LOWER EXTREMITIES: Primary | ICD-10-CM

## 2023-06-29 DIAGNOSIS — M76.62 ACHILLES TENDINITIS OF BOTH LOWER EXTREMITIES: Primary | ICD-10-CM

## 2023-06-29 DIAGNOSIS — M79.671 FOOT PAIN, BILATERAL: ICD-10-CM

## 2023-06-29 PROCEDURE — 99213 OFFICE O/P EST LOW 20 MIN: CPT | Performed by: PODIATRIST

## 2023-06-29 RX ORDER — METHYLPREDNISOLONE 4 MG/1
TABLET ORAL
Qty: 1 KIT | Refills: 0 | Status: SHIPPED | OUTPATIENT
Start: 2023-06-29

## 2023-07-07 DIAGNOSIS — E11.9 CONTROLLED TYPE 2 DIABETES MELLITUS WITHOUT COMPLICATION, WITHOUT LONG-TERM CURRENT USE OF INSULIN (HCC): ICD-10-CM

## 2023-08-29 LAB
ALBUMIN SERPL-MCNC: 4.6 G/DL
ALP BLD-CCNC: 70 U/L
ALT SERPL-CCNC: 28 U/L
ANION GAP SERPL CALCULATED.3IONS-SCNC: NORMAL MMOL/L
AST SERPL-CCNC: 25 U/L
AVERAGE GLUCOSE: 169
BILIRUB SERPL-MCNC: 0.3 MG/DL (ref 0.1–1.4)
BUN BLDV-MCNC: 13 MG/DL
CALCIUM SERPL-MCNC: 9.4 MG/DL
CHLORIDE BLD-SCNC: 102 MMOL/L
CHOLESTEROL, TOTAL: 192 MG/DL
CHOLESTEROL/HDL RATIO: 4.2
CO2: 23 MMOL/L
CREAT SERPL-MCNC: 0.72 MG/DL
CREATININE URINE: NORMAL
EGFR: 104
GLUCOSE BLD-MCNC: 120 MG/DL
HBA1C MFR BLD: 7.5 %
HDLC SERPL-MCNC: 46 MG/DL (ref 35–70)
LDL CHOLESTEROL CALCULATED: 94 MG/DL (ref 0–160)
MICROALBUMIN/CREAT 24H UR: 2.9 MG/G{CREAT}
NONHDLC SERPL-MCNC: NORMAL MG/DL
POTASSIUM SERPL-SCNC: 3.9 MMOL/L
SODIUM BLD-SCNC: 139 MMOL/L
TOTAL PROTEIN: 7.1
TRIGL SERPL-MCNC: 258 MG/DL
VLDLC SERPL CALC-MCNC: 52 MG/DL

## 2023-08-30 DIAGNOSIS — E11.9 CONTROLLED TYPE 2 DIABETES MELLITUS WITHOUT COMPLICATION, WITHOUT LONG-TERM CURRENT USE OF INSULIN (HCC): ICD-10-CM

## 2023-08-30 DIAGNOSIS — I10 ESSENTIAL HYPERTENSION: ICD-10-CM

## 2023-08-30 DIAGNOSIS — E78.2 HYPERCHOLESTEROLEMIA WITH HYPERTRIGLYCERIDEMIA: ICD-10-CM

## 2023-09-01 ENCOUNTER — OFFICE VISIT (OUTPATIENT)
Dept: FAMILY MEDICINE CLINIC | Age: 48
End: 2023-09-01
Payer: COMMERCIAL

## 2023-09-01 VITALS
HEART RATE: 75 BPM | BODY MASS INDEX: 32.85 KG/M2 | RESPIRATION RATE: 16 BRPM | DIASTOLIC BLOOD PRESSURE: 74 MMHG | SYSTOLIC BLOOD PRESSURE: 132 MMHG | HEIGHT: 63 IN | TEMPERATURE: 97.6 F | WEIGHT: 185.4 LBS | OXYGEN SATURATION: 100 %

## 2023-09-01 DIAGNOSIS — M54.50 BILATERAL LOW BACK PAIN WITHOUT SCIATICA, UNSPECIFIED CHRONICITY: ICD-10-CM

## 2023-09-01 DIAGNOSIS — E78.2 HYPERCHOLESTEROLEMIA WITH HYPERTRIGLYCERIDEMIA: ICD-10-CM

## 2023-09-01 DIAGNOSIS — Z12.11 SCREEN FOR COLON CANCER: ICD-10-CM

## 2023-09-01 DIAGNOSIS — E11.65 UNCONTROLLED TYPE 2 DIABETES MELLITUS WITH HYPERGLYCEMIA (HCC): Primary | ICD-10-CM

## 2023-09-01 DIAGNOSIS — M62.830 MUSCLE SPASM OF BACK: ICD-10-CM

## 2023-09-01 DIAGNOSIS — I10 ESSENTIAL HYPERTENSION: ICD-10-CM

## 2023-09-01 DIAGNOSIS — N92.1 MENORRHAGIA WITH IRREGULAR CYCLE: ICD-10-CM

## 2023-09-01 PROCEDURE — 99214 OFFICE O/P EST MOD 30 MIN: CPT | Performed by: FAMILY MEDICINE

## 2023-09-01 PROCEDURE — 3074F SYST BP LT 130 MM HG: CPT | Performed by: FAMILY MEDICINE

## 2023-09-01 PROCEDURE — 3078F DIAST BP <80 MM HG: CPT | Performed by: FAMILY MEDICINE

## 2023-09-01 PROCEDURE — 3051F HG A1C>EQUAL 7.0%<8.0%: CPT | Performed by: FAMILY MEDICINE

## 2023-09-01 RX ORDER — FENOFIBRATE 134 MG/1
134 CAPSULE ORAL
Qty: 90 CAPSULE | Refills: 1 | Status: SHIPPED | OUTPATIENT
Start: 2023-09-01

## 2023-09-01 RX ORDER — FLASH GLUCOSE SCANNING READER
EACH MISCELLANEOUS
Qty: 1 EACH | Refills: 1 | Status: SHIPPED | OUTPATIENT
Start: 2023-09-01

## 2023-09-01 RX ORDER — EZETIMIBE 10 MG/1
10 TABLET ORAL DAILY
Qty: 90 TABLET | Refills: 3 | Status: SHIPPED | OUTPATIENT
Start: 2023-09-01

## 2023-09-01 RX ORDER — FENOFIBRATE 54 MG/1
54 TABLET ORAL DAILY
Qty: 90 TABLET | Refills: 1 | Status: CANCELLED | OUTPATIENT
Start: 2023-09-01

## 2023-09-01 RX ORDER — LOSARTAN POTASSIUM 50 MG/1
50 TABLET ORAL DAILY
Qty: 90 TABLET | Refills: 3 | Status: SHIPPED | OUTPATIENT
Start: 2023-09-01

## 2023-09-01 ASSESSMENT — ENCOUNTER SYMPTOMS: BACK PAIN: 1

## 2023-09-01 NOTE — PROGRESS NOTES
[facility]  [address] [place] [address]  [phone]      Narayan Barahona is a 53 y.o. female who presents today for her medical conditions/complaints as noted below. Narayan Barahona is c/o of Diabetes, Hypertension, and Hyperlipidemia      HPI:     Pt here today for follow-up of DM and HTN. Results reviewed with pt during OV today - A1c increased from 6.9% to 7.5%. Pt states that she has not been exercising this summer with heat and trouble with Achilles tendonitis b/l; plans to try to get back into exercising. Taking Metformin 500 mg BID - will continue current dose and improve diet/exercise; will re-check A1c again in 3 months. LP shows high LDL at 94 and TG's at 258 (no change from 1 year ago when Fenofibrate 54 mg daily was added). Feels improved with muscle pain/sx's since being off Lipitor; taking Zetia 10 mg daily. Will increase Fenofibrate to 134 mg daily and re-check LP again in 6 months; pt to also improve diet/exercise. Microalbumin normal - will re-check again in 1 year. CMP normal - will re-check again in 1 year. Pt has seen Dr. Christina Gordon for Achilles tendonitis in b/l lower extremities - has done 2 courses of steroids, and does stretches/ice as needed. Has orthotics she also got to wear in her tennis shoes. Feet feel approx 70% improved from before; when she was on the steroids, she had almost no pain. Will f/u with Dr. Christina Gordon now just as needed. Had seen Dr. Jerson Martines in the past for R elbow and wrist sx's - has been diagnosed with probable carpal tunnel syndrome. Had EMG in 1/2022. Has muscle knots in her back - discussed with chiropractor in the past.  Sees her regularly as needed. Sometimes when she tries to have her  rub the knots on the R side, they will shoot pain up into her neck/head. Pt still seeing Hematology (Dr. Hussein Flaherty) - still felt her anemia is due to menorrhagia.   Labs last summer showed lower

## 2023-09-06 DIAGNOSIS — E11.65 UNCONTROLLED TYPE 2 DIABETES MELLITUS WITH HYPERGLYCEMIA (HCC): ICD-10-CM

## 2023-09-06 NOTE — TELEPHONE ENCOUNTER
Pt states KB sent to [facility] for her the [facility] meter and sensor but [facility] said scripts did not match, but they did quote a price to pt and after ins it would cost pt approx $200. Pt is now going to go thru [facility] and would like corrected scripts for meter and sensor sent to [facility].

## 2023-09-08 RX ORDER — FLASH GLUCOSE SCANNING READER
EACH MISCELLANEOUS
Qty: 1 EACH | Refills: 1 | Status: CANCELLED | OUTPATIENT
Start: 2023-09-08

## 2023-09-08 NOTE — TELEPHONE ENCOUNTER
Layton Mask called requesting a refill of the below medication which has been pended for you:     Requested Prescriptions     Pending Prescriptions Disp Refills    Continuous Blood Gluc  (FREESTYLE PRAVEEN 14 DAY READER) VANESA 1 each 1     Sig: Use to continuously monitor blood glucose. Continuous Blood Gluc Sensor (FREESTYLE PRAVEEN 2 SENSOR) MISC 6 each 3     Sig: Use to continuously monitor blood glucose.        Last Appointment Date: 9/1/2023  Next Appointment Date: 3/6/2024    No Known Allergies

## 2023-11-06 ENCOUNTER — TELEPHONE (OUTPATIENT)
Dept: FAMILY MEDICINE CLINIC | Age: 48
End: 2023-11-06

## 2023-11-06 DIAGNOSIS — E11.65 UNCONTROLLED TYPE 2 DIABETES MELLITUS WITH HYPERGLYCEMIA (HCC): Primary | ICD-10-CM

## 2023-11-07 NOTE — TELEPHONE ENCOUNTER
Pt calling back stating as listed she would like to see Jatinder Carroll in Internal for Diabetic Ed since on last labs her A1c had went up and pt would like to have better control.

## 2023-11-07 NOTE — TELEPHONE ENCOUNTER
Spoke with pt for clarification. Would like to see Diabetic Education, does not want to change PCP to Christina Flores. Placed referral to MADDY Bowles now.

## 2023-11-11 ENCOUNTER — OFFICE VISIT (OUTPATIENT)
Dept: PRIMARY CARE CLINIC | Age: 48
End: 2023-11-11
Payer: COMMERCIAL

## 2023-11-11 VITALS
SYSTOLIC BLOOD PRESSURE: 142 MMHG | BODY MASS INDEX: 33.09 KG/M2 | TEMPERATURE: 97.9 F | OXYGEN SATURATION: 99 % | DIASTOLIC BLOOD PRESSURE: 88 MMHG | WEIGHT: 186.8 LBS | HEART RATE: 83 BPM

## 2023-11-11 DIAGNOSIS — H60.332 ACUTE SWIMMER'S EAR OF LEFT SIDE: Primary | ICD-10-CM

## 2023-11-11 PROCEDURE — 99213 OFFICE O/P EST LOW 20 MIN: CPT | Performed by: FAMILY MEDICINE

## 2023-11-11 RX ORDER — AMOXICILLIN AND CLAVULANATE POTASSIUM 875; 125 MG/1; MG/1
1 TABLET, FILM COATED ORAL 2 TIMES DAILY
Qty: 20 TABLET | Refills: 0 | Status: SHIPPED | OUTPATIENT
Start: 2023-11-11 | End: 2023-11-21

## 2023-11-11 NOTE — PROGRESS NOTES
2023     Gustavo Calderon (:  1975) is a 53 y.o. female, here for evaluation of the following medical concerns:    Otalgia   There is pain in the left ear. This is a recurrent problem. The current episode started more than 1 month ago (patient has been having off and on issues with irritation pain and swelling to the left ear. Has treated with over the counter drops and essential oils, but still getting recurrent issues). The problem has been waxing and waning. There has been no fever. Associated symptoms include ear discharge. Pertinent negatives include no coughing, headaches, hearing loss, rhinorrhea or sore throat. She has tried ear drops (essential oils) for the symptoms. Did review patient's med list, allergies, social history,pmhx and pshx today as noted in the record. Review of Systems   Constitutional:  Negative for chills, fatigue and fever. HENT:  Positive for ear discharge and ear pain. Negative for congestion, hearing loss, rhinorrhea and sore throat. Respiratory:  Negative for cough. Neurological:  Negative for headaches. Prior to Visit Medications    Medication Sig Taking? Authorizing Provider   losartan (COZAAR) 50 MG tablet Take 1 tablet by mouth daily Yes Annabelle Argueta, DO   ezetimibe (ZETIA) 10 MG tablet Take 1 tablet by mouth daily Yes Annabelle Argueta DO   fenofibrate micronized (LOFIBRA) 134 MG capsule Take 1 capsule by mouth every morning (before breakfast) Yes Annabelle Argueta DO   metFORMIN (GLUCOPHAGE) 500 MG tablet Take 1 tablet by mouth 2 times daily (with meals) Yes Rebecca Argueta, DO   Continuous Blood Gluc Sensor (FREESTYLE PRAVEEN 2 SENSOR) [facility] Use to continuously monitor blood glucose. Nicole Argueta, DO   Continuous Blood Gluc  (FREESTYLE PRAVEEN 2 READER) VANESA Use to continuously monitor blood glucose. Nicole Argueta, DO   Continuous Blood Gluc  (FREESTYLE PRAVEEN 14 DAY READER) VANESA Use to continuously monitor blood glucose.

## 2023-11-13 ASSESSMENT — ENCOUNTER SYMPTOMS
SORE THROAT: 0
COUGH: 0
RHINORRHEA: 0

## 2023-11-18 ENCOUNTER — HOSPITAL ENCOUNTER (OUTPATIENT)
Dept: GENERAL RADIOLOGY | Age: 48
End: 2023-11-18
Payer: COMMERCIAL

## 2023-11-18 DIAGNOSIS — M54.6 THORACOLUMBAR BACK PAIN: ICD-10-CM

## 2023-11-18 DIAGNOSIS — M54.2 NECK PAIN: ICD-10-CM

## 2023-11-18 DIAGNOSIS — M54.50 THORACOLUMBAR BACK PAIN: ICD-10-CM

## 2023-11-18 PROCEDURE — 72040 X-RAY EXAM NECK SPINE 2-3 VW: CPT

## 2023-11-18 PROCEDURE — 72110 X-RAY EXAM L-2 SPINE 4/>VWS: CPT

## 2024-01-15 ENCOUNTER — OFFICE VISIT (OUTPATIENT)
Dept: DIABETES SERVICES | Age: 49
End: 2024-01-15
Payer: COMMERCIAL

## 2024-01-15 VITALS
HEIGHT: 63 IN | OXYGEN SATURATION: 99 % | HEART RATE: 88 BPM | WEIGHT: 183 LBS | SYSTOLIC BLOOD PRESSURE: 138 MMHG | DIASTOLIC BLOOD PRESSURE: 86 MMHG | BODY MASS INDEX: 32.43 KG/M2

## 2024-01-15 DIAGNOSIS — E66.09 CLASS 1 OBESITY DUE TO EXCESS CALORIES WITH SERIOUS COMORBIDITY AND BODY MASS INDEX (BMI) OF 32.0 TO 32.9 IN ADULT: ICD-10-CM

## 2024-01-15 DIAGNOSIS — E11.9 TYPE 2 DIABETES MELLITUS WITHOUT COMPLICATION, WITH LONG-TERM CURRENT USE OF INSULIN (HCC): Primary | ICD-10-CM

## 2024-01-15 DIAGNOSIS — Z79.4 TYPE 2 DIABETES MELLITUS WITHOUT COMPLICATION, WITH LONG-TERM CURRENT USE OF INSULIN (HCC): Primary | ICD-10-CM

## 2024-01-15 DIAGNOSIS — E78.2 MIXED HYPERLIPIDEMIA: ICD-10-CM

## 2024-01-15 PROCEDURE — 95251 CONT GLUC MNTR ANALYSIS I&R: CPT | Performed by: NURSE PRACTITIONER

## 2024-01-15 PROCEDURE — 99205 OFFICE O/P NEW HI 60 MIN: CPT | Performed by: NURSE PRACTITIONER

## 2024-01-15 ASSESSMENT — ENCOUNTER SYMPTOMS
SHORTNESS OF BREATH: 0
DIARRHEA: 0
RESPIRATORY NEGATIVE: 1
ABDOMINAL PAIN: 0

## 2024-01-15 NOTE — PROGRESS NOTES
CECILIA Eid CNP on 1/15/2024 at 9:37 AM      (Please note that portions of this note were completed with a voice-recognition program. Efforts were made to edit the dictation but occasionally words are mis-transcribed.)

## 2024-01-16 LAB
CHOLESTEROL, TOTAL: 202 MG/DL
CHOLESTEROL/HDL RATIO: 4.9
ESTIMATED AVERAGE GLUCOSE: NORMAL
HBA1C MFR BLD: 7.2 %
HDLC SERPL-MCNC: 41 MG/DL (ref 35–70)
LDL CHOLESTEROL CALCULATED: 94 MG/DL (ref 0–160)
NONHDLC SERPL-MCNC: NORMAL MG/DL
TRIGL SERPL-MCNC: 334 MG/DL
VLDLC SERPL CALC-MCNC: 67 MG/DL

## 2024-01-17 ENCOUNTER — OFFICE VISIT (OUTPATIENT)
Dept: FAMILY MEDICINE CLINIC | Age: 49
End: 2024-01-17
Payer: COMMERCIAL

## 2024-01-17 VITALS
RESPIRATION RATE: 16 BRPM | HEART RATE: 81 BPM | OXYGEN SATURATION: 97 % | SYSTOLIC BLOOD PRESSURE: 138 MMHG | WEIGHT: 185.3 LBS | BODY MASS INDEX: 32.83 KG/M2 | DIASTOLIC BLOOD PRESSURE: 80 MMHG | HEIGHT: 63 IN | TEMPERATURE: 98.2 F

## 2024-01-17 DIAGNOSIS — E78.2 HYPERCHOLESTEROLEMIA WITH HYPERTRIGLYCERIDEMIA: ICD-10-CM

## 2024-01-17 DIAGNOSIS — M79.605 LUMBAR PAIN WITH RADIATION DOWN LEFT LEG: Primary | ICD-10-CM

## 2024-01-17 DIAGNOSIS — E11.65 UNCONTROLLED TYPE 2 DIABETES MELLITUS WITH HYPERGLYCEMIA (HCC): ICD-10-CM

## 2024-01-17 DIAGNOSIS — M54.50 LUMBAR PAIN WITH RADIATION DOWN LEFT LEG: Primary | ICD-10-CM

## 2024-01-17 PROCEDURE — 99214 OFFICE O/P EST MOD 30 MIN: CPT | Performed by: FAMILY MEDICINE

## 2024-01-17 RX ORDER — GEMFIBROZIL 600 MG/1
600 TABLET, FILM COATED ORAL
Qty: 60 TABLET | Refills: 0 | Status: SHIPPED | OUTPATIENT
Start: 2024-01-17

## 2024-01-17 NOTE — PROGRESS NOTES
[facility]  [address] [place] [address]  [phone]      Ashleigh Lazaro is a 49 y.o. female who presents today for her medical conditions/complaints as noted below.  Ashlegih Lazaro is c/o of Discuss XR results      HPI:     Pt here today for follow-up of x-rays.    Pt had x-ray of cervical and lumbar spine in November ordered by her chiropractor.  X-ray of cervical spine shows small bone spurs from C4-6, mostly R-sided and mild arthritis noted at multiple levels.      X-ray of lumbar spine showed mod facet arthrosis at L4-5 and severe at L5-S1.  Still gets pain across her low back, especially with prolonged standing or walking, like when doing dishes, or if she sits on the floor to wrap presents for too long.    Has pain into L buttock/hip intermittent with certain position.    Did PT for her back for maybe 10 sessions, but felt like it made things worse.  On her last evaluation, her ROM was improved but her Low Back Disability score was regressed.      Also had massage after PT - massage therapist felt like it might be more of a nerve issue in her posterior R shoulder, rather than muscle.  Pt notes that Dr. Reyes agreed.    Plans to do a yoga class tonight; otherwise, she has not been exercising due to pain.    L leg swelling x past 3 months - intermittent; not related to sodium intake.  Seems to be related to how long she is on her feet.  No pain with swelling.  Mostly in calf/shin area, not affecting foot/ankle.  Has not happened as much since she has been done with PT.  Had one dry needling session - they did not feel it was related to this at the PT office.    Pt increased Fenofibrate from 54 mg to 134 mg after last OV on 9/1 - after taking only 2 doses, she broke out with itchy rash on her arms/legs.  She stopped the higher dose and returned to 54 mg dose daily.  She just had LP re-checked yesterday - TG's are increased from 258 to 334.    A1c down to

## 2024-01-29 ENCOUNTER — HOSPITAL ENCOUNTER (OUTPATIENT)
Dept: MRI IMAGING | Age: 49
Discharge: HOME | End: 2024-01-31
Attending: FAMILY MEDICINE
Payer: COMMERCIAL

## 2024-01-29 DIAGNOSIS — M79.605 LUMBAR PAIN WITH RADIATION DOWN LEFT LEG: ICD-10-CM

## 2024-01-29 DIAGNOSIS — M54.50 LUMBAR PAIN WITH RADIATION DOWN LEFT LEG: ICD-10-CM

## 2024-01-29 PROCEDURE — 72148 MRI LUMBAR SPINE W/O DYE: CPT

## 2024-02-22 ENCOUNTER — OFFICE VISIT (OUTPATIENT)
Dept: DIABETES SERVICES | Age: 49
End: 2024-02-22
Payer: COMMERCIAL

## 2024-02-22 VITALS
OXYGEN SATURATION: 98 % | WEIGHT: 180 LBS | DIASTOLIC BLOOD PRESSURE: 86 MMHG | SYSTOLIC BLOOD PRESSURE: 122 MMHG | BODY MASS INDEX: 31.89 KG/M2 | HEIGHT: 63 IN | HEART RATE: 90 BPM

## 2024-02-22 DIAGNOSIS — E66.09 CLASS 1 OBESITY DUE TO EXCESS CALORIES WITH SERIOUS COMORBIDITY AND BODY MASS INDEX (BMI) OF 31.0 TO 31.9 IN ADULT: ICD-10-CM

## 2024-02-22 DIAGNOSIS — E78.2 MIXED HYPERLIPIDEMIA: ICD-10-CM

## 2024-02-22 DIAGNOSIS — E11.65 UNCONTROLLED TYPE 2 DIABETES MELLITUS WITH HYPERGLYCEMIA (HCC): Primary | ICD-10-CM

## 2024-02-22 PROCEDURE — 95251 CONT GLUC MNTR ANALYSIS I&R: CPT | Performed by: NURSE PRACTITIONER

## 2024-02-22 PROCEDURE — 99214 OFFICE O/P EST MOD 30 MIN: CPT | Performed by: NURSE PRACTITIONER

## 2024-02-22 PROCEDURE — 3051F HG A1C>EQUAL 7.0%<8.0%: CPT | Performed by: NURSE PRACTITIONER

## 2024-02-22 ASSESSMENT — ENCOUNTER SYMPTOMS
ABDOMINAL PAIN: 0
SHORTNESS OF BREATH: 0
DIARRHEA: 0
RESPIRATORY NEGATIVE: 1

## 2024-02-22 NOTE — PROGRESS NOTES
evening Achilles tendinitis of both lower extremities  (primary encounter diagnosis)  Foot pain, bilateral      Dispense posterior night splint. (Patient not taking: Reported on 1/17/2024) 1 each 0     No current facility-administered medications for this visit.       Review ofSymptoms:  Review of Systems   Constitutional:  Positive for fatigue. Negative for unexpected weight change.   Eyes:  Negative for visual disturbance.   Respiratory: Negative.  Negative for shortness of breath.    Cardiovascular:  Negative for chest pain and leg swelling.   Gastrointestinal:  Negative for abdominal pain and diarrhea.   Endocrine: Negative for polydipsia, polyphagia and polyuria.   Genitourinary: Negative.    Musculoskeletal: Negative.    Skin:  Negative for rash and wound.   Neurological:  Negative for dizziness, tremors, seizures and headaches.   Psychiatric/Behavioral: Negative.  Negative for confusion and decreased concentration.      Theremainder of a complete 14-point review of systems is negative.       Vital Signs: /86 (Site: Right Upper Arm, Position: Sitting)   Pulse 90   Ht 1.6 m (5' 3\")   Wt 81.6 kg (180 lb)   LMP 02/11/2024 (Approximate)   SpO2 98%   BMI 31.89 kg/m²      Wt Readings from Last 3 Encounters:   02/22/24 81.6 kg (180 lb)   01/17/24 84.1 kg (185 lb 4.8 oz)   01/15/24 83 kg (183 lb)     Body mass index is 31.89 kg/m².  LABS:  Hemoglobin A1C   Date Value Ref Range Status   01/16/2024 7.2 % Final   08/29/2023 7.5 % Final     No results found for: \"[id number]\"  Lab Results   Component Value Date     08/29/2023    K 3.9 08/29/2023     08/29/2023    CO2 23 08/29/2023    BUN 13 08/29/2023    CREATININE 0.72 08/29/2023    GLUCOSE 120 08/29/2023    CALCIUM 9.4 08/29/2023    PROT 6.5 03/10/2021    LABALBU 4.6 08/29/2023    BILITOT 0.3 08/29/2023    ALKPHOS 70 08/29/2023    AST 25 08/29/2023    ALT 28 08/29/2023    LABGLOM 104 08/29/2023     Lab Results   Component Value Date    CHOL 202

## 2024-03-04 ENCOUNTER — TELEPHONE (OUTPATIENT)
Dept: ONCOLOGY | Age: 49
End: 2024-03-04

## 2024-03-04 ENCOUNTER — OFFICE VISIT (OUTPATIENT)
Dept: ONCOLOGY | Age: 49
End: 2024-03-04
Payer: COMMERCIAL

## 2024-03-04 VITALS
HEART RATE: 84 BPM | DIASTOLIC BLOOD PRESSURE: 89 MMHG | TEMPERATURE: 97.4 F | BODY MASS INDEX: 32.24 KG/M2 | RESPIRATION RATE: 18 BRPM | SYSTOLIC BLOOD PRESSURE: 148 MMHG | OXYGEN SATURATION: 99 % | WEIGHT: 182 LBS

## 2024-03-04 DIAGNOSIS — D64.9 NORMOCYTIC ANEMIA: Primary | ICD-10-CM

## 2024-03-04 PROCEDURE — 99214 OFFICE O/P EST MOD 30 MIN: CPT | Performed by: INTERNAL MEDICINE

## 2024-03-04 PROCEDURE — 99211 OFF/OP EST MAY X REQ PHY/QHP: CPT | Performed by: INTERNAL MEDICINE

## 2024-03-04 NOTE — PATIENT INSTRUCTIONS
Please provide patient with a copy of labs from path Lab  RV 12 months with CBC, Iron studies at RV

## 2024-03-04 NOTE — TELEPHONE ENCOUNTER
Please provide patient with a copy of labs from path Lab  RV 12 months with CBC, Iron studies at RV          Gave pt copy of path labs from her media    Schedule for 2025 not built yet, office will reach out in December to schedule for rv    Labs (cbc iron tibc ferritin) will be done prior to visit at path labs, per pt.    An After Visit Summary was printed and given to the patient.    Electronically signed by Danisha Leija on 3/4/2024 at 11:41 AM

## 2024-03-05 NOTE — PROGRESS NOTES
_        Chief Complaint   Patient presents with    Follow-up     1 year     DIAGNOSIS:       Partially treated iron deficiency anemia  Probable malabsorption  Menorrhagia    CURRENT THERAPY:         IV iron infusion June 2021     BRIEF CASE HISTORY:      Ms. Ashleigh Lazaro is a very pleasant 49 y.o. female with history of multiple co morbidities as listed.  Patient is referred for further evaluation of anemia.  The patient states that about 2 to 3 years ago she had blood work which revealed iron deficiency.  She was started on oral iron 1 tablet daily and due to lack of response was made twice daily.  Patient was having problems with weakness and fatigue and feeling tired.  Symptoms were persistent and recently were getting worse.  Rare dizziness.  Occasional palpitation.  Patient has history of heavy menses with passage of clots.  No other source of bleeding.  No melena or hematochezia.  No hematemesis.  No hematuria.  Patient denies any weight loss or decreased appetite.  No fever or night sweats.  No other complaints.  Patient denies smoking.  Rare social alcohol drinking..   INTERIM HISTORY:   Seen for follow up iron deficiency anemia. S/p iv iron infusion. Felt better. Less weakness and fatigue. No active bleeding. No other complaints.     PAST MEDICAL HISTORY: has a past medical history of Allergic rhinitis, Anemia, Carpal tunnel syndrome, DM II (diabetes mellitus, type II), controlled (HCC), Gestational diabetes, Hyperlipidemia, Hyperthyroidism affecting pregnancy, and Incompetent cervix.    PAST SURGICAL HISTORY: has a past surgical history that includes Cervix surgery and Caspian tooth extraction.     CURRENT MEDICATIONS:  has a current medication list which includes the following prescription(s): freestyle dave 2 sensor, gemfibrozil, losartan, ezetimibe, freestyle dave 14 day reader, metformin, and ankle splint/night

## 2024-03-15 DIAGNOSIS — E78.2 HYPERCHOLESTEROLEMIA WITH HYPERTRIGLYCERIDEMIA: ICD-10-CM

## 2024-03-15 RX ORDER — GEMFIBROZIL 600 MG/1
600 TABLET, FILM COATED ORAL 2 TIMES DAILY
Qty: 180 TABLET | Refills: 0 | Status: SHIPPED | OUTPATIENT
Start: 2024-03-15

## 2024-03-15 NOTE — TELEPHONE ENCOUNTER
Ashleigh called requesting a refill of the below medication which has been pended for you:     Requested Prescriptions     Pending Prescriptions Disp Refills    gemfibrozil (LOPID) 600 MG tablet [Pharmacy Med Name: GEMFIBROZIL 600 MG TABLET] 60 tablet 0     Sig: take 1 tablet by mouth twice a day       Last Appointment Date: 1/17/2024  Next Appointment Date: 5/14/2024    No Known Allergies

## 2024-05-10 LAB
CHOLESTEROL, TOTAL: 179 MG/DL
CHOLESTEROL/HDL RATIO: 4.2
ESTIMATED AVERAGE GLUCOSE: 171
HBA1C MFR BLD: 7.6 %
HDLC SERPL-MCNC: 43 MG/DL (ref 35–70)
LDL CHOLESTEROL CALCULATED: 102 MG/DL (ref 0–160)
NONHDLC SERPL-MCNC: ABNORMAL MG/DL
TRIGL SERPL-MCNC: 171 MG/DL
VLDLC SERPL CALC-MCNC: 34 MG/DL

## 2024-05-22 ENCOUNTER — OFFICE VISIT (OUTPATIENT)
Dept: FAMILY MEDICINE CLINIC | Age: 49
End: 2024-05-22
Payer: COMMERCIAL

## 2024-05-22 ENCOUNTER — OFFICE VISIT (OUTPATIENT)
Dept: DIABETES SERVICES | Age: 49
End: 2024-05-22
Payer: COMMERCIAL

## 2024-05-22 VITALS
DIASTOLIC BLOOD PRESSURE: 78 MMHG | HEART RATE: 82 BPM | OXYGEN SATURATION: 99 % | HEIGHT: 63 IN | BODY MASS INDEX: 31.71 KG/M2 | WEIGHT: 179 LBS | SYSTOLIC BLOOD PRESSURE: 128 MMHG

## 2024-05-22 VITALS
HEIGHT: 63 IN | WEIGHT: 180.2 LBS | BODY MASS INDEX: 31.93 KG/M2 | DIASTOLIC BLOOD PRESSURE: 72 MMHG | SYSTOLIC BLOOD PRESSURE: 132 MMHG | HEART RATE: 93 BPM | OXYGEN SATURATION: 96 % | RESPIRATION RATE: 16 BRPM | TEMPERATURE: 97.7 F

## 2024-05-22 DIAGNOSIS — M54.50 BILATERAL LOW BACK PAIN WITHOUT SCIATICA, UNSPECIFIED CHRONICITY: Primary | ICD-10-CM

## 2024-05-22 DIAGNOSIS — E78.2 HYPERCHOLESTEROLEMIA WITH HYPERTRIGLYCERIDEMIA: ICD-10-CM

## 2024-05-22 DIAGNOSIS — E78.2 MIXED HYPERLIPIDEMIA: ICD-10-CM

## 2024-05-22 DIAGNOSIS — E66.09 CLASS 1 OBESITY DUE TO EXCESS CALORIES WITH SERIOUS COMORBIDITY AND BODY MASS INDEX (BMI) OF 31.0 TO 31.9 IN ADULT: ICD-10-CM

## 2024-05-22 DIAGNOSIS — Z12.31 SCREENING MAMMOGRAM FOR BREAST CANCER: ICD-10-CM

## 2024-05-22 DIAGNOSIS — E11.65 UNCONTROLLED TYPE 2 DIABETES MELLITUS WITH HYPERGLYCEMIA (HCC): ICD-10-CM

## 2024-05-22 DIAGNOSIS — E11.65 UNCONTROLLED TYPE 2 DIABETES MELLITUS WITH HYPERGLYCEMIA (HCC): Primary | ICD-10-CM

## 2024-05-22 PROCEDURE — 95251 CONT GLUC MNTR ANALYSIS I&R: CPT | Performed by: NURSE PRACTITIONER

## 2024-05-22 PROCEDURE — 99214 OFFICE O/P EST MOD 30 MIN: CPT | Performed by: NURSE PRACTITIONER

## 2024-05-22 PROCEDURE — 3051F HG A1C>EQUAL 7.0%<8.0%: CPT | Performed by: NURSE PRACTITIONER

## 2024-05-22 PROCEDURE — G2211 COMPLEX E/M VISIT ADD ON: HCPCS | Performed by: NURSE PRACTITIONER

## 2024-05-22 PROCEDURE — 3051F HG A1C>EQUAL 7.0%<8.0%: CPT | Performed by: FAMILY MEDICINE

## 2024-05-22 PROCEDURE — 99214 OFFICE O/P EST MOD 30 MIN: CPT | Performed by: FAMILY MEDICINE

## 2024-05-22 RX ORDER — SEMAGLUTIDE 1.34 MG/ML
INJECTION, SOLUTION SUBCUTANEOUS
Qty: 1 ADJUSTABLE DOSE PRE-FILLED PEN SYRINGE | Refills: 3 | Status: SHIPPED | OUTPATIENT
Start: 2024-05-22

## 2024-05-22 RX ORDER — NAPROXEN 500 MG/1
500 TABLET ORAL EVERY 12 HOURS PRN
Qty: 30 TABLET | Refills: 0 | Status: SHIPPED | OUTPATIENT
Start: 2024-05-22

## 2024-05-22 RX ORDER — GEMFIBROZIL 600 MG/1
600 TABLET, FILM COATED ORAL 2 TIMES DAILY
Qty: 180 TABLET | Refills: 3 | Status: SHIPPED | OUTPATIENT
Start: 2024-05-22

## 2024-05-22 SDOH — ECONOMIC STABILITY: FOOD INSECURITY: WITHIN THE PAST 12 MONTHS, THE FOOD YOU BOUGHT JUST DIDN'T LAST AND YOU DIDN'T HAVE MONEY TO GET MORE.: NEVER TRUE

## 2024-05-22 SDOH — ECONOMIC STABILITY: FOOD INSECURITY: WITHIN THE PAST 12 MONTHS, YOU WORRIED THAT YOUR FOOD WOULD RUN OUT BEFORE YOU GOT MONEY TO BUY MORE.: NEVER TRUE

## 2024-05-22 SDOH — ECONOMIC STABILITY: INCOME INSECURITY: HOW HARD IS IT FOR YOU TO PAY FOR THE VERY BASICS LIKE FOOD, HOUSING, MEDICAL CARE, AND HEATING?: NOT HARD AT ALL

## 2024-05-22 ASSESSMENT — PATIENT HEALTH QUESTIONNAIRE - PHQ9
SUM OF ALL RESPONSES TO PHQ QUESTIONS 1-9: 0
SUM OF ALL RESPONSES TO PHQ QUESTIONS 1-9: 0
SUM OF ALL RESPONSES TO PHQ9 QUESTIONS 1 & 2: 0
SUM OF ALL RESPONSES TO PHQ QUESTIONS 1-9: 0
SUM OF ALL RESPONSES TO PHQ QUESTIONS 1-9: 0
1. LITTLE INTEREST OR PLEASURE IN DOING THINGS: NOT AT ALL
2. FEELING DOWN, DEPRESSED OR HOPELESS: NOT AT ALL

## 2024-05-22 ASSESSMENT — ENCOUNTER SYMPTOMS
RESPIRATORY NEGATIVE: 1
SHORTNESS OF BREATH: 0
ABDOMINAL PAIN: 0
DIARRHEA: 0

## 2024-05-22 NOTE — PROGRESS NOTES
[facility]  [address] [facility], [place] [id number] [phone]      Ashleigh Lazaro is a 49 y.o. female who presents today for her medical conditions/complaints as noted below.  Ashleigh Lazaro is c/o of Diabetes (6 mo)      HPI:     Pt here today for follow-up of DM and HYPERLIPIDEMIA.    Pt saw Maite Eid already today for DM - A1c increased to 7.6% on 5/10.  Pt states that she doesn't think her diet has been worse since last A1c; states she actually thought it was better.  States this is the highest A1c she has had.  Has tried food journaling more often recently.  Has not been exercising as much due to back pain.      Maite recommended stopping Metformin and starting Ozempic - new Rx sent to pharmacy today.  Pt will continue Metformin until she gets the Ozempic.  Will see Maite for f/u and re-check A1c in 8/2024.    TG's improved from 334 to 171 - has tried to watch her intake of junk food/processed foods.  Still taking Zetia 10 mg daily and Lopid 600 mg BID - tolerating well.    Has been seeing Dr. Nugent (chiropractor) intermittently still for her lumbar symptoms; also gets intermittent massages.  Has not seen relief with Tylenol and Ibuprofen.  Tried Tylenol Arthritis, but it caused nausea.  Pain is less frequent than when discussed at last visit, but seems more severe when it does occur.  Wants to start doing exercises and get back into exercise classes.      Started a new vitamin with iron for h/o anemia.  Had discussed iron treatment with Dr. Packer (Hematology) but her levels weren't very low (per pt), so no IV treatment ordered.  Periods have been absent x past 4 months so waiting to see Gyn to talk about any treatment for menorrhagia leading to worsened anemia.          Past Medical History:   Diagnosis Date    Allergic rhinitis     Anemia     Carpal tunnel syndrome     bilat hands    DM II (diabetes mellitus, type II), controlled (formerly [facility])     Gestational

## 2024-05-29 ASSESSMENT — ENCOUNTER SYMPTOMS: BACK PAIN: 1

## 2024-07-06 DIAGNOSIS — E11.9 CONTROLLED TYPE 2 DIABETES MELLITUS WITHOUT COMPLICATION, WITHOUT LONG-TERM CURRENT USE OF INSULIN (HCC): ICD-10-CM

## 2024-07-08 NOTE — TELEPHONE ENCOUNTER
Per last OV note, pt was going to finish metformin until she got Ozempic. Ozempic dispensed 5/24/24 for 56 days.

## 2024-08-20 DIAGNOSIS — E11.65 UNCONTROLLED TYPE 2 DIABETES MELLITUS WITH HYPERGLYCEMIA (HCC): ICD-10-CM

## 2024-08-20 NOTE — TELEPHONE ENCOUNTER
Ashleigh called requesting a refill of the below medication which has been pended for you:     Requested Prescriptions     Pending Prescriptions Disp Refills    Continuous Glucose  (FREESTYLE PRAVEEN 2 READER) VANESA [Pharmacy Med Name: FreeStyle Praveen 2 [place] Glucose Monitoring System]  0       Last Appointment Date: 5/22/2024  Next Appointment Date: 8/28/2024    No Known Allergies

## 2024-08-22 LAB
ALBUMIN: 4.4 G/DL
ALP BLD-CCNC: 68 U/L
ALT SERPL-CCNC: 21 U/L
ANION GAP SERPL CALCULATED.3IONS-SCNC: ABNORMAL MMOL/L
AST SERPL-CCNC: 21 U/L
BILIRUB SERPL-MCNC: 11 MG/DL (ref 0.1–1.4)
BUN BLDV-MCNC: 14 MG/DL
CALCIUM SERPL-MCNC: 9.2 MG/DL
CHLORIDE BLD-SCNC: 106 MMOL/L
CHOLESTEROL, TOTAL: 155 MG/DL
CHOLESTEROL/HDL RATIO: 3.4
CO2: 25 MMOL/L
CREAT SERPL-MCNC: 0.76 MG/DL
GFR, ESTIMATED: ABNORMAL
GLUCOSE BLD-MCNC: 104 MG/DL
HDLC SERPL-MCNC: 45 MG/DL (ref 35–70)
LDL CHOLESTEROL: 84
NONHDLC SERPL-MCNC: NORMAL MG/DL
POTASSIUM SERPL-SCNC: 4 MMOL/L
SODIUM BLD-SCNC: 142 MMOL/L
TOTAL PROTEIN: 6.8 G/DL (ref 6.4–8.2)
TRIGL SERPL-MCNC: 128 MG/DL
VLDLC SERPL CALC-MCNC: 26 MG/DL

## 2024-08-28 ENCOUNTER — OFFICE VISIT (OUTPATIENT)
Dept: DIABETES SERVICES | Age: 49
End: 2024-08-28
Payer: COMMERCIAL

## 2024-08-28 ENCOUNTER — HOSPITAL ENCOUNTER (OUTPATIENT)
Age: 49
Setting detail: SPECIMEN
Discharge: HOME | End: 2024-08-28
Payer: COMMERCIAL

## 2024-08-28 ENCOUNTER — OFFICE VISIT (OUTPATIENT)
Dept: FAMILY MEDICINE CLINIC | Age: 49
End: 2024-08-28
Payer: COMMERCIAL

## 2024-08-28 ENCOUNTER — HOSPITAL ENCOUNTER (OUTPATIENT)
Dept: MAMMOGRAPHY | Age: 49
Discharge: HOME | End: 2024-08-30
Attending: FAMILY MEDICINE
Payer: COMMERCIAL

## 2024-08-28 VITALS
HEIGHT: 63 IN | SYSTOLIC BLOOD PRESSURE: 138 MMHG | BODY MASS INDEX: 31.01 KG/M2 | OXYGEN SATURATION: 98 % | WEIGHT: 175 LBS | HEART RATE: 88 BPM | DIASTOLIC BLOOD PRESSURE: 82 MMHG

## 2024-08-28 VITALS
TEMPERATURE: 97.9 F | HEART RATE: 87 BPM | OXYGEN SATURATION: 97 % | HEIGHT: 63 IN | DIASTOLIC BLOOD PRESSURE: 80 MMHG | RESPIRATION RATE: 16 BRPM | BODY MASS INDEX: 31.01 KG/M2 | SYSTOLIC BLOOD PRESSURE: 126 MMHG | WEIGHT: 175 LBS

## 2024-08-28 VITALS — HEIGHT: 63 IN | WEIGHT: 172 LBS | BODY MASS INDEX: 30.48 KG/M2

## 2024-08-28 DIAGNOSIS — Z01.419 ENCOUNTER FOR WELL WOMAN EXAM WITH ROUTINE GYNECOLOGICAL EXAM: Primary | ICD-10-CM

## 2024-08-28 DIAGNOSIS — Z12.4 CERVICAL CANCER SCREENING: ICD-10-CM

## 2024-08-28 DIAGNOSIS — E11.9 CONTROLLED TYPE 2 DIABETES MELLITUS WITHOUT COMPLICATION, WITHOUT LONG-TERM CURRENT USE OF INSULIN (HCC): Primary | ICD-10-CM

## 2024-08-28 DIAGNOSIS — E78.2 HYPERCHOLESTEROLEMIA WITH HYPERTRIGLYCERIDEMIA: ICD-10-CM

## 2024-08-28 DIAGNOSIS — E66.09 CLASS 1 OBESITY DUE TO EXCESS CALORIES WITH SERIOUS COMORBIDITY AND BODY MASS INDEX (BMI) OF 31.0 TO 31.9 IN ADULT: ICD-10-CM

## 2024-08-28 DIAGNOSIS — I10 ESSENTIAL HYPERTENSION: ICD-10-CM

## 2024-08-28 DIAGNOSIS — E78.2 MIXED HYPERLIPIDEMIA: ICD-10-CM

## 2024-08-28 DIAGNOSIS — E11.9 CONTROLLED TYPE 2 DIABETES MELLITUS WITHOUT COMPLICATION, WITHOUT LONG-TERM CURRENT USE OF INSULIN (HCC): ICD-10-CM

## 2024-08-28 DIAGNOSIS — M79.89 SWELLING OF LEFT LOWER EXTREMITY: ICD-10-CM

## 2024-08-28 DIAGNOSIS — Z12.31 SCREENING MAMMOGRAM FOR BREAST CANCER: ICD-10-CM

## 2024-08-28 DIAGNOSIS — M54.50 BILATERAL LOW BACK PAIN WITHOUT SCIATICA, UNSPECIFIED CHRONICITY: ICD-10-CM

## 2024-08-28 PROCEDURE — 3074F SYST BP LT 130 MM HG: CPT | Performed by: FAMILY MEDICINE

## 2024-08-28 PROCEDURE — G2211 COMPLEX E/M VISIT ADD ON: HCPCS | Performed by: NURSE PRACTITIONER

## 2024-08-28 PROCEDURE — G0123 SCREEN CERV/VAG THIN LAYER: HCPCS

## 2024-08-28 PROCEDURE — 99396 PREV VISIT EST AGE 40-64: CPT | Performed by: FAMILY MEDICINE

## 2024-08-28 PROCEDURE — 77063 BREAST TOMOSYNTHESIS BI: CPT

## 2024-08-28 PROCEDURE — 99214 OFFICE O/P EST MOD 30 MIN: CPT | Performed by: NURSE PRACTITIONER

## 2024-08-28 PROCEDURE — 3044F HG A1C LEVEL LT 7.0%: CPT | Performed by: NURSE PRACTITIONER

## 2024-08-28 PROCEDURE — 3079F DIAST BP 80-89 MM HG: CPT | Performed by: FAMILY MEDICINE

## 2024-08-28 PROCEDURE — 87624 HPV HI-RISK TYP POOLED RSLT: CPT

## 2024-08-28 RX ORDER — NAPROXEN 500 MG/1
500 TABLET ORAL EVERY 12 HOURS PRN
Qty: 180 TABLET | Refills: 1 | Status: SHIPPED | OUTPATIENT
Start: 2024-08-28

## 2024-08-28 RX ORDER — GEMFIBROZIL 600 MG/1
600 TABLET, FILM COATED ORAL 2 TIMES DAILY
Qty: 180 TABLET | Refills: 3 | Status: SHIPPED | OUTPATIENT
Start: 2024-08-28

## 2024-08-28 RX ORDER — LOSARTAN POTASSIUM 50 MG/1
50 TABLET ORAL DAILY
Qty: 90 TABLET | Refills: 3 | Status: SHIPPED | OUTPATIENT
Start: 2024-08-28

## 2024-08-28 RX ORDER — EZETIMIBE 10 MG/1
10 TABLET ORAL DAILY
Qty: 90 TABLET | Refills: 3 | Status: SHIPPED | OUTPATIENT
Start: 2024-08-28

## 2024-08-28 ASSESSMENT — ENCOUNTER SYMPTOMS
DIARRHEA: 0
ABDOMINAL PAIN: 0
SHORTNESS OF BREATH: 0
RESPIRATORY NEGATIVE: 1

## 2024-08-28 NOTE — PROGRESS NOTES
leg more swollen between knee and ankle than R; not tender).   Genitourinary:  Negative for dyspareunia, genital sores and vaginal discharge.       Objective:     Vitals:    08/28/24 1321   BP: 126/80   Site: Right Upper Arm   Position: Sitting   Pulse: 87   Resp: 16   Temp: 97.9 °F (36.6 °C)   TempSrc: Temporal   SpO2: 97%   Weight: 79.4 kg (175 lb)   Height: 1.6 m (5' 3\")     Physical Exam  Vitals and nursing note reviewed.   Constitutional:       General: She is not in acute distress.     Appearance: She is well-developed.   HENT:      Head: Normocephalic and atraumatic.   Eyes:      Conjunctiva/sclera: Conjunctivae normal.   Neck:      Thyroid: No thyromegaly.   Cardiovascular:      Rate and Rhythm: Normal rate and regular rhythm.      Heart sounds: Normal heart sounds.   Pulmonary:      Effort: Pulmonary effort is normal. No respiratory distress.      Breath sounds: Normal breath sounds.   Chest:   Breasts:     Right: No inverted nipple, mass, nipple discharge, skin change or tenderness.      Left: No inverted nipple, mass, nipple discharge, skin change or tenderness.   Abdominal:      General: Bowel sounds are normal. There is no distension.      Palpations: Abdomen is soft.      Tenderness: There is no abdominal tenderness.   Genitourinary:     Exam position: Supine.      Labia:         Right: No rash or lesion.         Left: No rash or lesion.       Vagina: Normal.      Cervix: No cervical motion tenderness.      Uterus: Not enlarged and not tender.       Adnexa:         Right: No tenderness or fullness.          Left: No tenderness or fullness.     Musculoskeletal:         General: Swelling (L lower leg - 2.5 cm larger in circumference than R lower leg; nontender; no pitting or erythema) present.      Cervical back: Neck supple.   Skin:     General: Skin is warm and dry.      Findings: No lesion.   Neurological:      Mental Status: She is alert and oriented to person, place, and time.         Diabetic foot

## 2024-08-28 NOTE — PROGRESS NOTES
[facility]  [address] [facility] [address] [phone]        HISTORY:    Ashliegh Lazaro presents today for evaluation and management of:  Chief Complaint   Patient presents with    Diabetes     Will make goal for dm eye exam       Patient Care Team:  Annabelle Argueta DO as PCP - General (Family Medicine)  Annabelle Argueta DO as PCP - Empaneled Provider      Interval History:    Past DM Medications   Metformin- therapy completed      Current Diabetic Medications   Ozempic 0.5 mg once weekly      DKA episodes: 0    05/22/24   Ashleigh Lazaro is a 49 y.o. female patient who presents to clinic today for her diabetes. she has a history of hyperlipidemia, gestational dm and obesity which contributes to her diabetes. At previous visit diabetes counseling was provided. she denies any current signs or symptoms of hyper/hypoglycemia. she states they are taking their medications as prescribed without any adverse effects.   Diet: low carb counting carbs average   Exercise: none  BS testing: uses cgm daily with success and is adherent to cgm therapy     Hypoglycemia: No  Hypoglycemia as needed treatment: snack  Issues:   Diabetic foot exam up-to-date: Yes  Diabetic retinal exam up-to-date: No     Diabetes complications:none    08/28/24   Ashleigh Lazaro is a 49 y.o. female patient who presents to clinic today for her diabetes. she has a history of hyperlipidemia, gestational dm and obesity which contributes to her diabetes. At previous visit metformin was stopped and ozempic was started. she denies any current signs or symptoms of hyper/hypoglycemia. she states they are taking their medications as prescribed without any adverse effects.   Diet: low carb counting carbs average   Exercise: none  BS testing: uses cgm daily with success and is adherent to cgm therapy  Hypoglycemia: No  Hypoglycemia as needed treatment:  min.      Electronically signed by CECILIA Tamayo CNP on 8/28/2024 at 12:10 PM      (Please note that portions of this note were completed with a voice-recognition program. Efforts were made to edit the dictation but occasionally words are mis-transcribed.)

## 2024-08-30 LAB
HPV I/H RISK 4 DNA CVX QL NAA+PROBE: NOT DETECTED
HPV SAMPLE: NORMAL
HPV, INTERPRETATION: NORMAL
HPV16 DNA CVX QL NAA+PROBE: NOT DETECTED
HPV18 DNA CVX QL NAA+PROBE: NOT DETECTED
SPECIMEN DESCRIPTION: NORMAL

## 2024-09-06 ENCOUNTER — TELEPHONE (OUTPATIENT)
Dept: CT IMAGING | Age: 49
End: 2024-09-06

## 2024-09-06 DIAGNOSIS — R92.8 ABNORMALITY OF RIGHT BREAST ON SCREENING MAMMOGRAM: Primary | ICD-10-CM

## 2024-09-06 LAB — CYTOLOGY REPORT: NORMAL

## 2024-09-11 ENCOUNTER — HOSPITAL ENCOUNTER (OUTPATIENT)
Dept: MAMMOGRAPHY | Age: 49
Discharge: HOME | End: 2024-09-13
Payer: COMMERCIAL

## 2024-09-11 ENCOUNTER — HOSPITAL ENCOUNTER (OUTPATIENT)
Dept: ULTRASOUND IMAGING | Age: 49
Discharge: HOME | End: 2024-09-13
Attending: FAMILY MEDICINE
Payer: COMMERCIAL

## 2024-09-11 DIAGNOSIS — R92.8 ABNORMALITY OF RIGHT BREAST ON SCREENING MAMMOGRAM: ICD-10-CM

## 2024-09-11 PROCEDURE — 76642 ULTRASOUND BREAST LIMITED: CPT

## 2024-09-11 PROCEDURE — G0279 TOMOSYNTHESIS, MAMMO: HCPCS

## 2024-09-27 ENCOUNTER — TELEPHONE (OUTPATIENT)
Dept: FAMILY MEDICINE CLINIC | Age: 49
End: 2024-09-27

## 2024-10-01 NOTE — TELEPHONE ENCOUNTER
Labs pended for 3 months per result note. Please review. Will need faxed to [facility] [facility] at [phone]. Please notify once approved.
Ordered.
Orders faxed.
never true

## 2024-10-02 ENCOUNTER — HOSPITAL ENCOUNTER (OUTPATIENT)
Dept: INTERVENTIONAL RADIOLOGY/VASCULAR | Age: 49
Discharge: HOME | End: 2024-10-04
Attending: FAMILY MEDICINE
Payer: COMMERCIAL

## 2024-10-02 DIAGNOSIS — M79.89 SWELLING OF LEFT LOWER EXTREMITY: ICD-10-CM

## 2024-10-02 PROCEDURE — 93971 EXTREMITY STUDY: CPT

## 2024-10-03 LAB
VAS LEFT CFV DIAM: 13 MM
VAS LEFT FV MID DIAM: 9 MM
VAS LEFT GSV AT KNEE DIAM: 2 MM
VAS LEFT GSV BK PROX DIAM: 2 MM
VAS LEFT GSV JUNC DIAM: 7 MM
VAS LEFT GSV THIGH MID DIAM: 2 MM
VAS LEFT POPLITEAL VEIN DIAM: 10 MM
VAS LEFT SSV PROX DIAM: 4 MM

## 2024-10-04 DIAGNOSIS — E11.65 UNCONTROLLED TYPE 2 DIABETES MELLITUS WITH HYPERGLYCEMIA (HCC): ICD-10-CM

## 2024-10-04 RX ORDER — SEMAGLUTIDE 0.68 MG/ML
0.5 INJECTION, SOLUTION SUBCUTANEOUS WEEKLY
Qty: 3 ML | Refills: 2 | Status: SHIPPED | OUTPATIENT
Start: 2024-10-04

## 2024-10-04 NOTE — TELEPHONE ENCOUNTER
Pharmacy requesting a refill of the below medication which has been pended for you:     Requested Prescriptions     Pending Prescriptions Disp Refills    Semaglutide,0.25 or 0.5MG/DOS, (OZEMPIC, 0.25 OR 0.5 MG/DOSE,) 2 MG/3ML SOPN [Pharmacy Med Name: Ozempic (0.25 or 0.5 MG/DOSE) 2 MG/3ML Subcutaneous Solution Pen-injector] 3 mL 0     Sig: INJECT 0.25 MG ONCE WEEKLY FOR 4 WEEKS THEN INCREASE TO 0.5 ONCE WEEKLY       Last Appointment Date: 8/28/2024  Next Appointment Date: 12/2/2024    No Known Allergies

## 2024-11-16 ENCOUNTER — HOSPITAL ENCOUNTER (OUTPATIENT)
Dept: GENERAL RADIOLOGY | Age: 49
Discharge: HOME | End: 2024-11-18
Payer: COMMERCIAL

## 2024-11-16 ENCOUNTER — OFFICE VISIT (OUTPATIENT)
Dept: PRIMARY CARE CLINIC | Age: 49
End: 2024-11-16
Payer: COMMERCIAL

## 2024-11-16 ENCOUNTER — HOSPITAL ENCOUNTER (OUTPATIENT)
Age: 49
Discharge: HOME | End: 2024-11-18
Payer: COMMERCIAL

## 2024-11-16 VITALS
SYSTOLIC BLOOD PRESSURE: 122 MMHG | OXYGEN SATURATION: 98 % | HEIGHT: 63 IN | WEIGHT: 168 LBS | RESPIRATION RATE: 16 BRPM | BODY MASS INDEX: 29.77 KG/M2 | TEMPERATURE: 98.6 F | HEART RATE: 82 BPM | DIASTOLIC BLOOD PRESSURE: 80 MMHG

## 2024-11-16 DIAGNOSIS — M25.531 RIGHT WRIST PAIN: ICD-10-CM

## 2024-11-16 DIAGNOSIS — M25.531 RIGHT WRIST PAIN: Primary | ICD-10-CM

## 2024-11-16 PROCEDURE — 99213 OFFICE O/P EST LOW 20 MIN: CPT | Performed by: NURSE PRACTITIONER

## 2024-11-16 PROCEDURE — 73110 X-RAY EXAM OF WRIST: CPT

## 2024-11-16 ASSESSMENT — ENCOUNTER SYMPTOMS
WHEEZING: 0
SHORTNESS OF BREATH: 0
COUGH: 0

## 2024-11-16 NOTE — PROGRESS NOTES
[facility]  [address] [place], [id number]  [phone]      HPI:     Wrist Injury   The incident occurred more than 1 week ago. The incident occurred at home. Injury mechanism: was walking a dog and it pulled her. The pain is present in the right wrist. The quality of the pain is described as aching and shooting. The pain does not radiate. The pain is at a severity of 7/10. The pain is moderate. The pain has been Fluctuating since the incident. Pertinent negatives include no chest pain, muscle weakness, numbness or tingling. The symptoms are aggravated by movement, palpation and lifting. She has tried NSAIDs, rest and ice for the symptoms. The treatment provided mild relief.       Current Outpatient Medications   Medication Sig Dispense Refill    Semaglutide,0.25 or 0.5MG/DOS, (OZEMPIC, 0.25 OR 0.5 MG/DOSE,) 2 MG/3ML SOPN Inject 0.5 mg into the skin once a week 3 mL 2    ezetimibe (ZETIA) 10 MG tablet Take 1 tablet by mouth daily 90 tablet 3    losartan (COZAAR) 50 MG tablet Take 1 tablet by mouth daily 90 tablet 3    gemfibrozil (LOPID) 600 MG tablet Take 1 tablet by mouth 2 times daily 180 tablet 3    naproxen (NAPROSYN) 500 MG tablet Take 1 tablet by mouth every 12 hours as needed for Pain 180 tablet 1    Continuous Glucose  (FREESTYLE PRAVEEN 2 READER) [facility] Use to check blood glucose continuously. 1 each 1    Continuous Blood Gluc Sensor (FREESTYLE PRAVEEN 2 SENSOR) [facility] Use to continuously monitor blood glucose. 6 each 3     No current facility-administered medications for this visit.     No Known Allergies    All patients pastmedical, surgical, social and family history has been reviewed.    Subjective:      Review of Systems   Constitutional:  Positive for activity change. Negative for appetite change, fatigue and fever.   Respiratory:  Negative for cough, shortness of breath and wheezing.    Cardiovascular:  Negative for chest pain and palpitations.

## 2024-11-18 ENCOUNTER — TELEPHONE (OUTPATIENT)
Dept: FAMILY MEDICINE CLINIC | Age: 49
End: 2024-11-18

## 2024-11-18 NOTE — TELEPHONE ENCOUNTER
Pt states she got a xray over the weekend wondering if the results had been seen. She would like a call back at [phone]. Please advise.

## 2024-11-18 NOTE — TELEPHONE ENCOUNTER
Pt calling for result. Wondered if Flared[employer] message could be sent as soon as it gets read. Please advise.

## 2024-11-22 ENCOUNTER — OFFICE VISIT (OUTPATIENT)
Dept: FAMILY MEDICINE CLINIC | Age: 49
End: 2024-11-22

## 2024-11-22 VITALS
SYSTOLIC BLOOD PRESSURE: 130 MMHG | BODY MASS INDEX: 29.2 KG/M2 | DIASTOLIC BLOOD PRESSURE: 84 MMHG | RESPIRATION RATE: 14 BRPM | HEART RATE: 80 BPM | WEIGHT: 164.8 LBS | OXYGEN SATURATION: 98 % | TEMPERATURE: 97.8 F | HEIGHT: 63 IN

## 2024-11-22 DIAGNOSIS — M79.89 SWELLING OF LEFT LOWER EXTREMITY: Primary | ICD-10-CM

## 2024-11-22 DIAGNOSIS — D48.5 NEOPLASM OF UNCERTAIN BEHAVIOR OF SKIN OF BACK: ICD-10-CM

## 2024-11-22 DIAGNOSIS — E11.65 UNCONTROLLED TYPE 2 DIABETES MELLITUS WITH HYPERGLYCEMIA (HCC): ICD-10-CM

## 2024-11-22 NOTE — PROGRESS NOTES
[facility]  [address] [id number] [phone]      Ashleigh Lazaro is a 49 y.o. female who presents today for her medical conditions/complaints as noted below.  Ashleigh Lazaro is c/o of Hyperlipidemia, Back Pain, and Skin Tag (Mid back )      HPI:     Pt here today for follow-up of skin and L leg swelling.    Has noticed that her L leg swelling is not much different than at last OV  Has had a couple episodes of very brief \"tinging\" pain a few times since last visit    X-ray of R wrist on 11/20 at  - negative, other than mild arthritis   Pain mostly at ulnar aspect of R wrist  Had some mild swelling  A dog \"yanked\" on her R arm approx 11 days ago - slowly improving  Had tried ice, wrist brace, Ibuprofen, and Icy-Hot    Taking Ozempic 0.5 mg once weekly on Monday's per Maite Ragini  Has lost 11 lbs since her last OV here on 8/28    Having only infrequent hot flashes but almost getting more chills now  Has noticed that her glucose will be higher if she is having a hot flashes (maybe 150's) compared to when she's not (120's).  Doesn't think it's from her glucose being elevated, as that usually feels different; feels like the hot flash starts first and then the glucose changes            Past Medical History:   Diagnosis Date    Allergic rhinitis     Anemia     Carpal tunnel syndrome     bilat hands    DM II (diabetes mellitus, type II), controlled (HCC)     Gestational diabetes     Hyperlipidemia     Hyperthyroidism affecting pregnancy     during pregnancy only    Incompetent cervix       Past Surgical History:   Procedure Laterality Date    CERVIX SURGERY      cerclage; 1998, 2000, 2007    WISDOM TOOTH EXTRACTION       Family History   Problem Relation Age of Onset    Hypertension Mother     Diabetes Mother     Osteoporosis Mother     Lung Cancer Father     Hypertension Brother     Colon Cancer Maternal Grandfather         diagnosed in his 60's     Social

## 2025-01-03 DIAGNOSIS — E11.65 UNCONTROLLED TYPE 2 DIABETES MELLITUS WITH HYPERGLYCEMIA (HCC): ICD-10-CM

## 2025-01-03 RX ORDER — SEMAGLUTIDE 0.68 MG/ML
INJECTION, SOLUTION SUBCUTANEOUS
Qty: 3 ML | Refills: 0 | OUTPATIENT
Start: 2025-01-03

## 2025-01-06 DIAGNOSIS — E11.65 UNCONTROLLED TYPE 2 DIABETES MELLITUS WITH HYPERGLYCEMIA (HCC): ICD-10-CM

## 2025-01-06 RX ORDER — SEMAGLUTIDE 0.68 MG/ML
INJECTION, SOLUTION SUBCUTANEOUS
Qty: 3 ML | Refills: 0 | OUTPATIENT
Start: 2025-01-06

## 2025-01-07 DIAGNOSIS — E11.65 UNCONTROLLED TYPE 2 DIABETES MELLITUS WITH HYPERGLYCEMIA (HCC): ICD-10-CM

## 2025-01-08 RX ORDER — SEMAGLUTIDE 0.68 MG/ML
INJECTION, SOLUTION SUBCUTANEOUS
Qty: 3 ML | Refills: 0 | Status: SHIPPED | OUTPATIENT
Start: 2025-01-08

## 2025-01-16 ENCOUNTER — OFFICE VISIT (OUTPATIENT)
Dept: DIABETES SERVICES | Age: 50
End: 2025-01-16

## 2025-01-16 VITALS
HEART RATE: 86 BPM | HEIGHT: 63 IN | OXYGEN SATURATION: 99 % | DIASTOLIC BLOOD PRESSURE: 86 MMHG | WEIGHT: 168 LBS | BODY MASS INDEX: 29.77 KG/M2 | SYSTOLIC BLOOD PRESSURE: 134 MMHG

## 2025-01-16 DIAGNOSIS — Z79.4 TYPE 2 DIABETES MELLITUS WITHOUT COMPLICATION, WITH LONG-TERM CURRENT USE OF INSULIN (HCC): Primary | ICD-10-CM

## 2025-01-16 DIAGNOSIS — E78.2 MIXED HYPERLIPIDEMIA: ICD-10-CM

## 2025-01-16 DIAGNOSIS — E11.9 TYPE 2 DIABETES MELLITUS WITHOUT COMPLICATION, WITH LONG-TERM CURRENT USE OF INSULIN (HCC): Primary | ICD-10-CM

## 2025-01-16 ASSESSMENT — ENCOUNTER SYMPTOMS
RESPIRATORY NEGATIVE: 1
SHORTNESS OF BREATH: 0

## 2025-01-16 NOTE — PROGRESS NOTES
visit to record, process the conversation to generate a clinical note, and support improvement of the AI technology. The patient (or guardian, if applicable) and other individuals in attendance at the appointment consented to the use of AI, including the recording.          Electronically signed by CECILIA Tamayo CNP on 1/16/2025 at 8:14 AM      (Please note that portions of this note were completed with a voice-recognition program. Efforts were made to edit the dictation but occasionally words are mis-transcribed.)

## 2025-01-24 ENCOUNTER — TELEPHONE (OUTPATIENT)
Dept: ONCOLOGY | Age: 50
End: 2025-01-24

## 2025-01-29 DIAGNOSIS — K90.9 IRON MALABSORPTION: Primary | ICD-10-CM

## 2025-01-30 DIAGNOSIS — E11.65 UNCONTROLLED TYPE 2 DIABETES MELLITUS WITH HYPERGLYCEMIA (HCC): ICD-10-CM

## 2025-02-03 RX ORDER — SEMAGLUTIDE 0.68 MG/ML
INJECTION, SOLUTION SUBCUTANEOUS
Qty: 3 ML | Refills: 3 | Status: SHIPPED | OUTPATIENT
Start: 2025-02-03

## 2025-02-17 ENCOUNTER — TELEPHONE (OUTPATIENT)
Dept: INTERNAL MEDICINE | Age: 50
End: 2025-02-17

## 2025-02-17 NOTE — TELEPHONE ENCOUNTER
PA for Peewee 2 sensors completed and approved.     PA Case ID #: [id number]  Need Help? Call us at [phone]  Outcome  Approved today by Angelica [employer] 2017 K  Approved. Approved for FREESTYLE PEEWEE 2 SENSOR Misc, quantity up to 6 sensors per 84 days, under the pharmacy benefit. The drug has been approved from 02/17/2025 to 02/17/2026. Generic or biosimilar substitution may be required when available and preferred on the formulary. Please note that dispensing of non-maintenance and specialty medications may be limited to a monthly supply.  Authorization Expiration Date: 2/17/2026

## 2025-02-24 LAB
ALBUMIN: 4.4 G/DL
ALP BLD-CCNC: 70 U/L
ALT SERPL-CCNC: 18 U/L
ANION GAP SERPL CALCULATED.3IONS-SCNC: 15 MMOL/L
AST SERPL-CCNC: 17 U/L
BASOPHILS ABSOLUTE: 0.03 /ΜL
BASOPHILS RELATIVE PERCENT: 0.5 %
BILIRUB SERPL-MCNC: 0.3 MG/DL (ref 0.1–1.4)
BUN BLDV-MCNC: 14 MG/DL
CALCIUM SERPL-MCNC: 9.1 MG/DL
CHLORIDE BLD-SCNC: 105 MMOL/L
CHOLESTEROL, TOTAL: 135 MG/DL
CHOLESTEROL/HDL RATIO: 3.6
CO2: 22 MMOL/L
CREAT SERPL-MCNC: 0.71 MG/DL
CREATININE URINE: NORMAL
EOSINOPHILS ABSOLUTE: 0.08 /ΜL
EOSINOPHILS RELATIVE PERCENT: 1.3 %
GFR, ESTIMATED: NORMAL
GLUCOSE BLD-MCNC: 97 MG/DL
HCT VFR BLD CALC: 39.5 % (ref 36–46)
HDLC SERPL-MCNC: 37 MG/DL (ref 35–70)
HEMOGLOBIN: 13 G/DL (ref 12–16)
LDL CHOLESTEROL: 78
LYMPHOCYTES ABSOLUTE: 1.25 /ΜL
LYMPHOCYTES RELATIVE PERCENT: 19.8 %
MCH RBC QN AUTO: 26.9 PG
MCHC RBC AUTO-ENTMCNC: 32.9 G/DL
MCV RBC AUTO: 82 FL
MICROALBUMIN/CREAT 24H UR: 26.2 MG/DL
MICROALBUMIN/CREAT UR-RTO: NORMAL
MONOCYTES ABSOLUTE: 0.33 /ΜL
MONOCYTES RELATIVE PERCENT: 5.2 %
NEUTROPHILS ABSOLUTE: 4.59 /ΜL
NEUTROPHILS RELATIVE PERCENT: ABNORMAL
NONHDLC SERPL-MCNC: ABNORMAL MG/DL
PDW BLD-RTO: 15.1 %
PLATELET # BLD: 215 K/ΜL
PMV BLD AUTO: ABNORMAL FL
POTASSIUM SERPL-SCNC: 4 MMOL/L
RBC # BLD: 4.84 10^6/ΜL
SODIUM BLD-SCNC: 142 MMOL/L
TOTAL PROTEIN: 6.9 G/DL (ref 6.4–8.2)
TRIGL SERPL-MCNC: 98 MG/DL
VLDLC SERPL CALC-MCNC: 20 MG/DL
WBC # BLD: 6.3 10^3/ML

## 2025-02-25 DIAGNOSIS — E11.65 UNCONTROLLED TYPE 2 DIABETES MELLITUS WITH HYPERGLYCEMIA (HCC): ICD-10-CM

## 2025-02-25 DIAGNOSIS — I10 ESSENTIAL HYPERTENSION: ICD-10-CM

## 2025-02-25 DIAGNOSIS — E78.2 HYPERCHOLESTEROLEMIA WITH HYPERTRIGLYCERIDEMIA: ICD-10-CM

## 2025-02-26 ENCOUNTER — OFFICE VISIT (OUTPATIENT)
Dept: FAMILY MEDICINE CLINIC | Age: 50
End: 2025-02-26
Payer: COMMERCIAL

## 2025-02-26 VITALS
BODY MASS INDEX: 29.64 KG/M2 | RESPIRATION RATE: 16 BRPM | HEIGHT: 63 IN | SYSTOLIC BLOOD PRESSURE: 136 MMHG | DIASTOLIC BLOOD PRESSURE: 86 MMHG | OXYGEN SATURATION: 99 % | WEIGHT: 167.3 LBS | HEART RATE: 73 BPM | TEMPERATURE: 97.4 F

## 2025-02-26 DIAGNOSIS — E11.9 CONTROLLED TYPE 2 DIABETES MELLITUS WITHOUT COMPLICATION, WITHOUT LONG-TERM CURRENT USE OF INSULIN (HCC): Primary | ICD-10-CM

## 2025-02-26 DIAGNOSIS — R92.8 ABNORMALITY OF RIGHT BREAST ON SCREENING MAMMOGRAM: ICD-10-CM

## 2025-02-26 DIAGNOSIS — E78.2 HYPERCHOLESTEROLEMIA WITH HYPERTRIGLYCERIDEMIA: ICD-10-CM

## 2025-02-26 PROCEDURE — 99214 OFFICE O/P EST MOD 30 MIN: CPT | Performed by: FAMILY MEDICINE

## 2025-02-26 SDOH — ECONOMIC STABILITY: FOOD INSECURITY: WITHIN THE PAST 12 MONTHS, YOU WORRIED THAT YOUR FOOD WOULD RUN OUT BEFORE YOU GOT MONEY TO BUY MORE.: NEVER TRUE

## 2025-02-26 SDOH — ECONOMIC STABILITY: FOOD INSECURITY: WITHIN THE PAST 12 MONTHS, THE FOOD YOU BOUGHT JUST DIDN'T LAST AND YOU DIDN'T HAVE MONEY TO GET MORE.: NEVER TRUE

## 2025-02-26 ASSESSMENT — PATIENT HEALTH QUESTIONNAIRE - PHQ9
SUM OF ALL RESPONSES TO PHQ QUESTIONS 1-9: 0
2. FEELING DOWN, DEPRESSED OR HOPELESS: NOT AT ALL
SUM OF ALL RESPONSES TO PHQ QUESTIONS 1-9: 0
1. LITTLE INTEREST OR PLEASURE IN DOING THINGS: NOT AT ALL
SUM OF ALL RESPONSES TO PHQ QUESTIONS 1-9: 0
SUM OF ALL RESPONSES TO PHQ QUESTIONS 1-9: 0

## 2025-02-26 NOTE — PROGRESS NOTES
Right Upper Arm   Position: Sitting   Pulse: 73   Resp: 16   Temp: 97.4 °F (36.3 °C)   TempSrc: Temporal   SpO2: 99%   Weight: 75.9 kg (167 lb 4.8 oz)   Height: 1.6 m (5' 3\")     Physical Exam  Constitutional:       General: She is not in acute distress.     Appearance: Normal appearance.   HENT:      Head: Normocephalic and atraumatic.   Eyes:      Extraocular Movements: Extraocular movements intact.      Conjunctiva/sclera: Conjunctivae normal.      Pupils: Pupils are equal, round, and reactive to light.   Cardiovascular:      Rate and Rhythm: Normal rate and regular rhythm.      Heart sounds: Normal heart sounds.   Pulmonary:      Effort: Pulmonary effort is normal. No respiratory distress.      Breath sounds: Normal breath sounds.   Abdominal:      General: Bowel sounds are normal. There is no distension.      Palpations: Abdomen is soft.      Tenderness: There is no abdominal tenderness.   Skin:     General: Skin is warm and dry.   Neurological:      General: No focal deficit present.      Mental Status: She is alert and oriented to person, place, and time.   Psychiatric:         Mood and Affect: Mood normal.         Assessment:      1. Controlled type 2 diabetes mellitus without complication, without long-term current use of insulin ([facility])  -     Albumin/Creatinine Ratio, Urine; Future  -     Comprehensive Metabolic Panel; Future  2. Hypercholesterolemia with hypertriglyceridemia  -     Lipid Panel; Future  -     Comprehensive Metabolic Panel; Future  3. Abnormality of right breast on screening mammogram  -     [facility] DIGITAL DIAGNOSTIC UNILATERAL RIGHT; Future         Plan:      Assessment & Plan  1. Diabetes Mellitus.  Her A1c levels have been consistently improving since May 2024, with the most recent reading being 6.4 in November 2024. Her blood glucose level was recorded at 97 two days ago, which is the lowest it has been in recent times. She is currently on Ozempic 0.5 mg once a week, administered on

## 2025-03-07 ENCOUNTER — OFFICE VISIT (OUTPATIENT)
Dept: ONCOLOGY | Age: 50
End: 2025-03-07
Payer: COMMERCIAL

## 2025-03-07 VITALS
HEART RATE: 86 BPM | HEIGHT: 63 IN | RESPIRATION RATE: 16 BRPM | BODY MASS INDEX: 30.03 KG/M2 | DIASTOLIC BLOOD PRESSURE: 83 MMHG | TEMPERATURE: 96.3 F | SYSTOLIC BLOOD PRESSURE: 127 MMHG | WEIGHT: 169.5 LBS

## 2025-03-07 DIAGNOSIS — D50.8 IRON DEFICIENCY ANEMIA SECONDARY TO INADEQUATE DIETARY IRON INTAKE: Primary | ICD-10-CM

## 2025-03-07 DIAGNOSIS — K90.9 IRON MALABSORPTION: ICD-10-CM

## 2025-03-07 PROCEDURE — 99214 OFFICE O/P EST MOD 30 MIN: CPT | Performed by: INTERNAL MEDICINE

## 2025-03-07 PROCEDURE — 99211 OFF/OP EST MAY X REQ PHY/QHP: CPT | Performed by: INTERNAL MEDICINE

## 2025-03-19 ENCOUNTER — HOSPITAL ENCOUNTER (OUTPATIENT)
Dept: ULTRASOUND IMAGING | Age: 50
Discharge: HOME | End: 2025-03-21
Attending: FAMILY MEDICINE
Payer: COMMERCIAL

## 2025-03-19 ENCOUNTER — HOSPITAL ENCOUNTER (OUTPATIENT)
Dept: MAMMOGRAPHY | Age: 50
Discharge: HOME | End: 2025-03-21
Attending: FAMILY MEDICINE
Payer: COMMERCIAL

## 2025-03-19 DIAGNOSIS — R92.8 ABNORMALITY OF RIGHT BREAST ON SCREENING MAMMOGRAM: ICD-10-CM

## 2025-03-19 PROCEDURE — G0279 TOMOSYNTHESIS, MAMMO: HCPCS

## 2025-03-19 PROCEDURE — 76642 ULTRASOUND BREAST LIMITED: CPT

## 2025-03-21 ENCOUNTER — RESULTS FOLLOW-UP (OUTPATIENT)
Dept: ULTRASOUND IMAGING | Age: 50
End: 2025-03-21

## 2025-03-21 ENCOUNTER — RESULTS FOLLOW-UP (OUTPATIENT)
Dept: MAMMOGRAPHY | Age: 50
End: 2025-03-21

## 2025-03-25 ENCOUNTER — OFFICE VISIT (OUTPATIENT)
Dept: SURGERY | Age: 50
End: 2025-03-25
Payer: COMMERCIAL

## 2025-03-25 VITALS
SYSTOLIC BLOOD PRESSURE: 164 MMHG | DIASTOLIC BLOOD PRESSURE: 90 MMHG | WEIGHT: 165 LBS | BODY MASS INDEX: 29.23 KG/M2 | HEART RATE: 96 BPM | HEIGHT: 63 IN

## 2025-03-25 DIAGNOSIS — F41.9 ANXIETY: ICD-10-CM

## 2025-03-25 DIAGNOSIS — N63.11 MASS OF UPPER OUTER QUADRANT OF RIGHT BREAST: Primary | ICD-10-CM

## 2025-03-25 PROCEDURE — 99204 OFFICE O/P NEW MOD 45 MIN: CPT | Performed by: SURGERY

## 2025-03-25 RX ORDER — ALPRAZOLAM 0.25 MG
0.25 TABLET ORAL 3 TIMES DAILY PRN
Qty: 40 TABLET | Refills: 0 | Status: SHIPPED | OUTPATIENT
Start: 2025-03-25 | End: 2025-04-24

## 2025-03-25 NOTE — PROGRESS NOTES
Breast Evaluation Work Sheet    3/25/2025    Patient:Ashleigh Lazaro               :1975    Race:     Age of Menarche: 12    Age of 1st live Birth (zero if no live births): 18    Number of Pregnancies: 5  Number of live births: 3    LMP: 25    Number of previous breast biopsies: None  Any with atypical pathology N/A    History DCIS or LCIS: No    Personal History of other Cancers: No   Type: NA    Family History Breast Cancer and Age of onset:    Mother No               Sister(s) No    Maternal GM No      Daughter(s) No    Paternal GM No       Other(s) No    History of other breast problems:  None     Nipple Drainage: No      Color: Frequency: None     Spontaneous: No   Cyst(s): No    Pain: N/A    Skin Changes: Inflammation        
Stress : Not at all   Social Connections: Socially Isolated (4/21/2021)    Social Connection and Isolation Panel [NHANES]     Frequency of Communication with Friends and Family: Once a week     Frequency of Social Gatherings with Friends and Family: Once a week     Attends Shinto Services: Never     Active Member of Clubs or Organizations: No     Attends Club or Organization Meetings: Never     Marital Status:    Intimate Partner Violence: Not At Risk (4/21/2021)    Humiliation, Afraid, Rape, and Kick questionnaire     Fear of Current or Ex-Partner: No     Emotionally Abused: No     Physically Abused: No     Sexually Abused: No   Housing Stability: Low Risk  (2/26/2025)    Housing Stability Vital Sign     Unable to Pay for Housing in the Last Year: No     Number of Times Moved in the Last Year: 0     Homeless in the Last Year: No       ROS:   Review of Systems - General ROS: negative  Psychological ROS: Reports increased anxiety since imaging was completed with abnormality noted.  Ophthalmic ROS: negative  ENT ROS: negative  Breast ROS: per HPI  Respiratory ROS: no cough, shortness of breath, or wheezing  Cardiovascular ROS: no chest pain or dyspnea on exertion  Gastrointestinal ROS: no abdominal pain, change in bowel habits, or black or bloody stools  Genito-Urinary ROS: no dysuria, trouble voiding, or hematuria  Musculoskeletal ROS: negative      Objective   Vitals:    03/25/25 0928   BP: (!) 164/90   Pulse: 96     General:in no apparent distress and well developed and well nourished  Eyes: No gross abnormalities.  Ears, Nose, Throat: hearing grossly normal bilaterally  Neck: neck supple and non tender without mass  Lungs: clear to auscultation without wheezes or rales   Heart: S1S2, no mumurs, RRR  Breast: On visual inspection there is no abnormality noted of either breast.  No contour changes, skin changes, nipple retractions or discoloration.  Left: Palpation of the left breast shows fairly homogenous

## 2025-03-25 NOTE — ASSESSMENT & PLAN NOTE
Patient is reporting a lot of anxiety and difficulty sleeping since learning of the abnormal imaging.  Going to give her a short course of low-dose of Xanax to see if this will help with the anxiety.  If she continues to use this medication will defer to her PCP for ongoing management.

## 2025-03-25 NOTE — ASSESSMENT & PLAN NOTE
Patient does have a imaging findings that raise concern for possible malignancy.  She has had significant change in 6 months and now has some abnormal appearing lymph nodes in the axilla.  Going to get her set up for image guided needle biopsy of the breast lesion as well as the abnormal lymph node.  Will follow-up pathology results and plan to see her back in office to discuss further.

## 2025-03-26 ENCOUNTER — HOSPITAL ENCOUNTER (OUTPATIENT)
Dept: ULTRASOUND IMAGING | Age: 50
Discharge: HOME | End: 2025-03-28
Payer: COMMERCIAL

## 2025-03-26 ENCOUNTER — HOSPITAL ENCOUNTER (OUTPATIENT)
Age: 50
Setting detail: SPECIMEN
Discharge: HOME | End: 2025-03-26

## 2025-03-26 ENCOUNTER — HOSPITAL ENCOUNTER (OUTPATIENT)
Dept: MAMMOGRAPHY | Age: 50
Discharge: HOME | End: 2025-03-28
Payer: COMMERCIAL

## 2025-03-26 DIAGNOSIS — N63.11 MASS OF UPPER OUTER QUADRANT OF RIGHT BREAST: ICD-10-CM

## 2025-03-26 PROCEDURE — 76942 ECHO GUIDE FOR BIOPSY: CPT

## 2025-03-26 PROCEDURE — 19083 BX BREAST 1ST LESION US IMAG: CPT

## 2025-03-26 PROCEDURE — 6360000002 HC RX W HCPCS

## 2025-03-26 PROCEDURE — 77065 DX MAMMO INCL CAD UNI: CPT

## 2025-03-27 ENCOUNTER — TELEPHONE (OUTPATIENT)
Dept: INTERNAL MEDICINE | Age: 50
End: 2025-03-27

## 2025-03-27 NOTE — TELEPHONE ENCOUNTER
Patient called stating that she currently has a mass in her breast and had a breast biopsy. She stated that she did not take her Ozempic shot this week and would like to stay off of it until she finds out what all is going on with her breast mass. Patient stated she is having stomach issues, complaining of lower abdominal bloating and firmness. She recently seen Dr. Argueta, and, per patient, stated that Dr. Argueta told her that she may have uterine fibroids. She would like a call back to discuss issues. Patient would like to know if she chooses to have abdominal US, should they be looking at anything from a diabetic, Ozempic, GI standpoint? Please advise.

## 2025-03-31 NOTE — TELEPHONE ENCOUNTER
Called and spoke with patient.  Patient stated that she will stay off of the Ozempic for now and see how things go.  [place] and did not take her glucometer or CGM.  She plans to check and monitor upon her return.  Patient agreed to update office.

## 2025-03-31 NOTE — TELEPHONE ENCOUNTER
Writer spoke with patient same day.  Patient stated that her last dose of glp1 was Wednesday, and is wondering if she should stop it or continue it. Please advise.

## 2025-03-31 NOTE — TELEPHONE ENCOUNTER
She is ok to hold the ozempic. She would need to hold it for 1 week before any surgery or procedure. Ozempic does not have any data to suggest effects on breast mass, uterine fibroids. However ozempic can cause nausea, vomiting, bloating, diarrhea and constipation. Us would not be needed to look for anything related to ozempic. How are blood sugar readings currently while off ozempic?

## 2025-04-01 LAB — SURGICAL PATHOLOGY REPORT: NORMAL

## 2025-04-02 ENCOUNTER — RESULTS FOLLOW-UP (OUTPATIENT)
Dept: SURGERY | Age: 50
End: 2025-04-02

## 2025-04-02 DIAGNOSIS — Z17.421 TRIPLE NEGATIVE BREAST CANCER (HCC): Primary | ICD-10-CM

## 2025-04-02 DIAGNOSIS — C50.919 TRIPLE NEGATIVE BREAST CANCER (HCC): Primary | ICD-10-CM

## 2025-04-07 ENCOUNTER — OFFICE VISIT (OUTPATIENT)
Dept: SURGERY | Age: 50
End: 2025-04-07
Payer: COMMERCIAL

## 2025-04-07 ENCOUNTER — TELEPHONE (OUTPATIENT)
Dept: ONCOLOGY | Age: 50
End: 2025-04-07

## 2025-04-07 VITALS
WEIGHT: 160 LBS | DIASTOLIC BLOOD PRESSURE: 86 MMHG | BODY MASS INDEX: 28.35 KG/M2 | HEART RATE: 102 BPM | SYSTOLIC BLOOD PRESSURE: 140 MMHG | HEIGHT: 63 IN | OXYGEN SATURATION: 97 %

## 2025-04-07 DIAGNOSIS — C50.919 TRIPLE NEGATIVE BREAST CANCER: Primary | ICD-10-CM

## 2025-04-07 DIAGNOSIS — C50.919 TRIPLE NEGATIVE BREAST CANCER: ICD-10-CM

## 2025-04-07 DIAGNOSIS — Z17.421 TRIPLE NEGATIVE BREAST CANCER: Primary | ICD-10-CM

## 2025-04-07 DIAGNOSIS — Z17.421 TRIPLE NEGATIVE BREAST CANCER: ICD-10-CM

## 2025-04-07 PROCEDURE — 99215 OFFICE O/P EST HI 40 MIN: CPT | Performed by: SURGERY

## 2025-04-07 NOTE — PROGRESS NOTES
Subjective   Ashleigh Lazaro is a 50 y.o. female who presents today for follow-up after recent image guided needle biopsy of a newly found right breast mass as well as image guided needle biopsy of a right axillary lymph node.  The patient comes into this nahomy results.  Since the biopsy she does report some bruising of the breast which worsened for the first couple days but seems to be slowly improving now.  Having very little pain after the biopsy.  Unfortunately her biopsy did come back as an invasive ductal carcinoma and is triple negative.  She is coming today to discuss the results further and to begin planning for additional testing as well as discuss treatment plans.  Patient is here with her  today.    Past Medical History:   Diagnosis Date    Allergic rhinitis     Anemia     Carpal tunnel syndrome     bilat hands    DM II (diabetes mellitus, type II), controlled (HCC)     Gestational diabetes     Hyperlipidemia     Hyperthyroidism affecting pregnancy     during pregnancy only    Incompetent cervix        Past Surgical History:   Procedure Laterality Date    CERVIX SURGERY      cerclage; 1998, 2000, 2007    US BIOPSY LYMPH NODE  3/26/2025    US BIOPSY LYMPH NODE 3/26/2025 [facility] ULTRASOUND    US BREAST BIOPSY W LOC DEVICE 1ST LESION RIGHT Right 3/26/2025    US BREAST BIOPSY W LOC DEVICE 1ST LESION RIGHT 3/26/2025 [facility] ULTRASOUND    WISDOM TOOTH EXTRACTION         Current Outpatient Medications   Medication Sig Dispense Refill    ALPRAZolam (XANAX) 0.25 MG tablet Take 1 tablet by mouth 3 times daily as needed for Anxiety for up to 30 days. Max Daily Amount: 0.75 mg 40 tablet 0    Multiple Vitamins-Minerals (HAIR SKIN & NAILS PO) Take by mouth daily      OZEMPIC, 0.25 OR 0.5 MG/DOSE, 2 MG/3ML SOPN INJECT 0.5 MG SUBCUTANEOUSLY ONCE A WEEK 3 mL 3    ezetimibe (ZETIA) 10 MG tablet Take 1 tablet by mouth daily 90 tablet 3    losartan (COZAAR) 50 MG tablet Take 1 tablet by mouth daily 90 tablet 3

## 2025-04-07 NOTE — ASSESSMENT & PLAN NOTE
The patient does have a newly diagnosed right invasive ductal carcinoma with triple negative receptor status.  Based on the workup thus far this would be a T2 lesion.  She does have at least 1 lymph node involved which is biopsy-proven and could potentially have additional lymph node involvement.  Would make her at least N1 and at this point no clinical concern for metastatic disease.  Clinically she is likely stage II-III.    I am going to go ahead and get a PET scan to evaluate for any metastatic lesions.  Will also get basic blood work.  Certainly if anything comes back as distant metastasis this would upstaged to stage IV.    As she does have triple negative status she would likely benefit from neoadjuvant chemotherapy as a first treatment regimen.  I have made a referral to oncology and she has on her own set up appointment to see the breast cancer treatment center at [facility] in [place].  I discussed with her that if she wants to keep both appointments that is perfectly fine.  However if she is leaning towards doing her treatment at [facility] she may not need to establish with oncologist here locally.  Will allow her to determine if she wants to keep those or not.    I did have a lengthy discussion with she and her  today about the type of breast cancer that she has, the receptor status and then also began to discuss treatment options including chemotherapy, radiation therapy as well as surgical treatment of breast cancer.  Questions were answered.    Orders entered for PET/CT as well as CBC and CMP.  I am going to tentatively plan to see the patient back in approximately 2 weeks and will ensure that we get that scheduled after she has completed her PET scan so that we have that information.  Again I discussed with her that if she establishes with [facility] and just wants to do all of her treatment there that is well within her rights and that we will ensure that any

## 2025-04-07 NOTE — TELEPHONE ENCOUNTER
Name: Ashleigh Lazaro  : 1975  MRN: [id number]    Oncology Navigation- Initial Note:  Navigator received navigation assignment via pathology and reviewing chart now.   Diagnosis: Breast cancer, triple negative   Patient saw Dr. Malik today and received pathology result. She  is scheduled with Dr. Billy 25.  Phone call to introduce ONN service. Reached voice mail. Left message requesting return call.    Patient returns call. She just called to cancel her appt with Dr. Billy tomorrow. She has appts at [facility] within the next few days.   She may call the clinic if she chooses to receive oncology care locally after her [facility] consults. She verbalized understanding. She has the phone numbers if she needs them. Encouraged her to call if she has questions/concerns.   Navigator will not follow at this time.     Electronically signed by Rhonda Mijares RN on 2025 at 11:27 AM

## 2025-04-14 ENCOUNTER — RESULTS FOLLOW-UP (OUTPATIENT)
Dept: SURGERY | Age: 50
End: 2025-04-14

## 2025-04-16 ENCOUNTER — OFFICE VISIT (OUTPATIENT)
Dept: DIABETES SERVICES | Age: 50
End: 2025-04-16
Payer: COMMERCIAL

## 2025-04-16 VITALS
WEIGHT: 160 LBS | OXYGEN SATURATION: 100 % | SYSTOLIC BLOOD PRESSURE: 160 MMHG | DIASTOLIC BLOOD PRESSURE: 84 MMHG | BODY MASS INDEX: 28.35 KG/M2 | HEIGHT: 63 IN | HEART RATE: 69 BPM

## 2025-04-16 DIAGNOSIS — E11.9 TYPE 2 DIABETES MELLITUS WITHOUT COMPLICATION, WITH LONG-TERM CURRENT USE OF INSULIN: Primary | ICD-10-CM

## 2025-04-16 DIAGNOSIS — Z79.4 TYPE 2 DIABETES MELLITUS WITHOUT COMPLICATION, WITH LONG-TERM CURRENT USE OF INSULIN: Primary | ICD-10-CM

## 2025-04-16 DIAGNOSIS — E78.2 MIXED HYPERLIPIDEMIA: ICD-10-CM

## 2025-04-16 PROCEDURE — 99214 OFFICE O/P EST MOD 30 MIN: CPT | Performed by: NURSE PRACTITIONER

## 2025-04-16 PROCEDURE — G2211 COMPLEX E/M VISIT ADD ON: HCPCS | Performed by: NURSE PRACTITIONER

## 2025-04-16 PROCEDURE — 3044F HG A1C LEVEL LT 7.0%: CPT | Performed by: NURSE PRACTITIONER

## 2025-04-16 ASSESSMENT — ENCOUNTER SYMPTOMS
RESPIRATORY NEGATIVE: 1
SHORTNESS OF BREATH: 0

## 2025-04-16 NOTE — PROGRESS NOTES
Patient Care Team:  Annabelle Argueta DO as PCP - General (Family Medicine)  Annabelle Argueta DO as PCP - Empaneled Provider    Past DM Medications   Metformin- therapy completed      Current Diabetic Medications   none     DKA episodes: 0    History of Present Illness  The patient presents for evaluation of diabetes mellitus, hypercholesterolemia, hypertension, breast cancer, and abdominal discomfort.    The patient has discontinued the use of Ozempic, with the last dose administered approximately 4 weeks ago. Home blood glucose monitoring reveals an average level in the 120s, with a recent reading of 124 mg/dL prior to this visit. The patient adheres to an alkaline diet, incorporating increased consumption of fruits, nuts, seeds, and alkaline water, while eliminating bread and pasta, substituting with carb-smart toast as needed.    The patient is currently prescribed losartan for hypertension management.    The diagnosis of breast cancer has been a significant source of distress. A breast MRI performed yesterday identified a lesion on the inner quadrant, and a subsequent biopsy is scheduled for tomorrow. The patient has consulted with a surgeon, plastic surgeon, oncologist, and radiologist. Additionally, a CT scan and bone scan have been completed. There is concern regarding the potential interaction between diabetes medication and cancer treatment.    The patient reports lower abdominal discomfort, previously discussed with Dr. Agrueta. A possible bowel obstruction secondary to constipation, potentially related to Ozempic use, was considered. A recent CT scan of the abdomen and pelvis was performed, and further consultation with Dr. Argueta or a gynecologist was recommended. The scan revealed a heterogeneous uterus, possibly associated with the menstrual cycle, and a left ovarian cyst, likely representing a dominant follicle in the right ovary. The patient is currently undergoing menopause and has noted abdominal

## 2025-04-21 ENCOUNTER — PATIENT MESSAGE (OUTPATIENT)
Dept: FAMILY MEDICINE CLINIC | Age: 50
End: 2025-04-21

## 2025-04-21 DIAGNOSIS — F41.9 ANXIETY: ICD-10-CM

## 2025-04-21 RX ORDER — ALPRAZOLAM 0.25 MG
0.25 TABLET ORAL 3 TIMES DAILY PRN
Qty: 40 TABLET | Refills: 0 | Status: CANCELLED | OUTPATIENT
Start: 2025-04-21 | End: 2025-05-21

## 2025-04-21 NOTE — TELEPHONE ENCOUNTER
Patient is currently seeing [facility] for the breast cancer diagnosis. Patient has no upcoming appointments with our office at this time.

## 2025-04-21 NOTE — TELEPHONE ENCOUNTER
Pt calling as she recently had a diagnosis of breast cancer, pt was given Xanax by Dr Malik, for her anxiety and then pt saw Oncology in [place] and they did not want pt on Xanax and told pt to call PCP to get on something else for her anxiety, pt uses pended pharmacy.

## 2025-04-23 ENCOUNTER — OFFICE VISIT (OUTPATIENT)
Dept: OBGYN | Age: 50
End: 2025-04-23
Payer: COMMERCIAL

## 2025-04-23 ENCOUNTER — TELEMEDICINE (OUTPATIENT)
Dept: FAMILY MEDICINE CLINIC | Age: 50
End: 2025-04-23
Payer: COMMERCIAL

## 2025-04-23 VITALS
HEART RATE: 89 BPM | BODY MASS INDEX: 27.68 KG/M2 | RESPIRATION RATE: 18 BRPM | WEIGHT: 156.2 LBS | DIASTOLIC BLOOD PRESSURE: 66 MMHG | HEIGHT: 63 IN | OXYGEN SATURATION: 98 % | SYSTOLIC BLOOD PRESSURE: 104 MMHG

## 2025-04-23 DIAGNOSIS — Z12.11 SCREEN FOR COLON CANCER: ICD-10-CM

## 2025-04-23 DIAGNOSIS — F41.9 ANXIETY: ICD-10-CM

## 2025-04-23 DIAGNOSIS — R93.89 ABNORMAL FINDING ON IMAGING: Primary | ICD-10-CM

## 2025-04-23 DIAGNOSIS — G47.9 SLEEP DIFFICULTIES: Primary | ICD-10-CM

## 2025-04-23 DIAGNOSIS — C50.411 MALIGNANT NEOPLASM OF UPPER-OUTER QUADRANT OF RIGHT BREAST IN FEMALE, ESTROGEN RECEPTOR POSITIVE (HCC): ICD-10-CM

## 2025-04-23 DIAGNOSIS — Z17.0 MALIGNANT NEOPLASM OF UPPER-OUTER QUADRANT OF RIGHT BREAST IN FEMALE, ESTROGEN RECEPTOR POSITIVE (HCC): ICD-10-CM

## 2025-04-23 PROCEDURE — 99214 OFFICE O/P EST MOD 30 MIN: CPT | Performed by: FAMILY MEDICINE

## 2025-04-23 PROCEDURE — 99203 OFFICE O/P NEW LOW 30 MIN: CPT | Performed by: OBSTETRICS & GYNECOLOGY

## 2025-04-23 RX ORDER — BUSPIRONE HYDROCHLORIDE 10 MG/1
10 TABLET ORAL 3 TIMES DAILY PRN
Qty: 15 TABLET | Refills: 0 | Status: SHIPPED | OUTPATIENT
Start: 2025-04-23

## 2025-04-23 RX ORDER — ALPRAZOLAM 0.25 MG
0.25 TABLET ORAL 2 TIMES DAILY PRN
Qty: 10 TABLET | Refills: 0 | Status: SHIPPED | OUTPATIENT
Start: 2025-04-23 | End: 2025-04-28

## 2025-04-23 RX ORDER — TRAZODONE HYDROCHLORIDE 50 MG/1
50 TABLET ORAL NIGHTLY PRN
Qty: 30 TABLET | Refills: 0 | Status: SHIPPED | OUTPATIENT
Start: 2025-04-23

## 2025-04-23 NOTE — PROGRESS NOTES
Ashleigh Lazaro  2025      Ashleigh Lazaro is a 50 y.o. female       The patient was seen today. She was here to follow-up regarding her labs and diagnostics ordered at her last visit for the diagnosis of:    ICD-10-CM    1. Abnormal finding on imaging CT Limited from [facility]  R93.89 US PELVIS COMPLETE NON-OB TRANSABDOMINAL AND TRANSVAGINAL      2. Malignant neoplasm of upper-outer quadrant of right breast in female, estrogen receptor NEGATIVE 3/2025 BX  C50.411     Z17.0       3. Screen for colon cancer  Z12.11 POCT Fecal Immunochemical Test (FIT)         Her bowels are regular and she is voiding without difficulty. Known breast cancer right UOQ with +ER receptor status. Obtained CT at [facility] for screen d/t Breast cancer dx. Imaging of pelvis was very limited without dimensions and complexities of cysts. The uterus noted heterogenicity by report but study was so limited an ultrasound will be ordered. Pt is [facility].     BRCA Testing: in process through [facility]      Past Medical History:   Diagnosis Date    Allergic rhinitis     Anemia     Breast cancer 2025    right breast    Carpal tunnel syndrome     bilat hands    DM II (diabetes mellitus, type II), controlled (HCC)     Gestational diabetes     Hyperlipidemia     Hyperthyroidism affecting pregnancy     during pregnancy only    Incompetent cervix          Past Surgical History:   Procedure Laterality Date    CERVIX SURGERY      cerclage; , ,     MRI BIOPSY BREAST W LOC DEVICE RIGHT EACH ADDL  2025    [facility]    US BIOPSY LYMPH NODE  2025    US BIOPSY LYMPH NODE 3/26/2025 [facility]    US BREAST BIOPSY W LOC DEVICE 1ST LESION RIGHT Right 2025    US BREAST BIOPSY W LOC DEVICE 1ST LESION RIGHT 3/26/2025 [facility] ULTRASOUND    WISDOM TOOTH EXTRACTION           Family History   Problem Relation Age of Onset    Hypertension Mother     Diabetes Mother

## 2025-04-23 NOTE — PROGRESS NOTES
Iveth, was evaluated through a synchronous (real-time) audio-video encounter. The patient (or guardian if applicable) is aware that this is a billable service, which includes applicable co-pays. This Virtual Visit was conducted with patient's (and/or legal guardian's) consent. Patient identification was verified, and a caregiver was present when appropriate.   The patient was located at Home: [address] [place] [id number]  Provider was located at Facility (Appt Dept): [address] [place] [address]  Confirm you are appropriately licensed, registered, or certified to deliver care in the state where the patient is located as indicated above. If you are not or unsure, please re-schedule the visit: Yes, I confirm.       Total time spent on this encounter: Not billed by time    --Annabelle Argueta, DO on 4/30/2025 at 2:45 PM    An electronic signature was used to authenticate this note.

## 2025-05-01 ENCOUNTER — HOSPITAL ENCOUNTER (OUTPATIENT)
Dept: INTERVENTIONAL RADIOLOGY/VASCULAR | Age: 50
Discharge: HOME | End: 2025-05-03
Attending: OBSTETRICS & GYNECOLOGY
Payer: COMMERCIAL

## 2025-05-01 DIAGNOSIS — R93.89 ABNORMAL FINDING ON IMAGING: ICD-10-CM

## 2025-05-01 PROCEDURE — 76856 US EXAM PELVIC COMPLETE: CPT

## 2025-05-07 ENCOUNTER — RESULTS FOLLOW-UP (OUTPATIENT)
Dept: OBGYN CLINIC | Age: 50
End: 2025-05-07

## 2025-05-15 ENCOUNTER — TELEMEDICINE (OUTPATIENT)
Dept: OBGYN | Age: 50
End: 2025-05-15
Payer: COMMERCIAL

## 2025-05-15 DIAGNOSIS — R93.89 ABNORMAL FINDING ON IMAGING: Primary | ICD-10-CM

## 2025-05-15 PROCEDURE — 99213 OFFICE O/P EST LOW 20 MIN: CPT | Performed by: OBSTETRICS & GYNECOLOGY

## 2025-05-15 NOTE — PROGRESS NOTES
Ashleigh Lazaro   5/15/2025        Ashleigh Lazaro is a 50 y.o. female is a Established patient, presenting virtually for evaluation of the followin. Abnormal finding on imaging CT Limited from [facility]            TELEHEALTH EVALUATION -- Audio/Visual       She was here to follow-up regarding her labs and diagnostics ordered at her last visit for the diagnosis of:    ICD-10-CM    1. Abnormal finding on imaging CT Limited from [facility]  R93.89 MRI PELVIS W CONTRAST          Fu today on pelvic ultrasound. Her bowels are regular and she is voiding without difficulty.       Past Medical History:   Diagnosis Date    Allergic rhinitis     Anemia     Breast cancer (HCC) 2025    right breast    Carpal tunnel syndrome     bilat hands    DM II (diabetes mellitus, type II), controlled (HCC)     Gestational diabetes     Hyperlipidemia     Hyperthyroidism affecting pregnancy     during pregnancy only    Incompetent cervix          Past Surgical History:   Procedure Laterality Date    CERVIX SURGERY      cerclage; , ,     MRI BIOPSY BREAST W LOC DEVICE RIGHT EACH ADDL  2025    [facility]    US BIOPSY LYMPH NODE  2025    US BIOPSY LYMPH NODE 3/26/2025 [facility]    US BREAST BIOPSY W LOC DEVICE 1ST LESION RIGHT Right 2025    US BREAST BIOPSY W LOC DEVICE 1ST LESION RIGHT 3/26/2025 [facility]    WISDOM TOOTH EXTRACTION           Family History   Problem Relation Age of Onset    Hypertension Mother     Diabetes Mother     Osteoporosis Mother     Lung Cancer Father     Hypertension Brother     Colon Cancer Maternal Grandfather         diagnosed in his 60's         Social History     Tobacco Use    Smoking status: Never    Smokeless tobacco: Never   Vaping Use    Vaping status: Never Used   Substance Use Topics    Alcohol use: No    Drug use: No         MEDICATIONS:  Current Outpatient Medications   Medication Sig Dispense Refill    busPIRone (BUSPAR) 10 MG tablet Take 1 tablet by

## 2025-05-23 DIAGNOSIS — G47.9 SLEEP DIFFICULTIES: ICD-10-CM

## 2025-05-23 DIAGNOSIS — F41.9 ANXIETY: ICD-10-CM

## 2025-05-25 RX ORDER — TRAZODONE HYDROCHLORIDE 50 MG/1
50 TABLET ORAL NIGHTLY PRN
Qty: 30 TABLET | Refills: 2 | Status: SHIPPED | OUTPATIENT
Start: 2025-05-25

## 2025-05-25 RX ORDER — BUSPIRONE HYDROCHLORIDE 10 MG/1
10 TABLET ORAL 3 TIMES DAILY PRN
Qty: 30 TABLET | Refills: 2 | Status: SHIPPED | OUTPATIENT
Start: 2025-05-25

## 2025-05-27 ENCOUNTER — TELEPHONE (OUTPATIENT)
Dept: GENERAL RADIOLOGY | Age: 50
End: 2025-05-27

## 2025-05-27 DIAGNOSIS — E11.9 CONTROLLED TYPE 2 DIABETES MELLITUS WITHOUT COMPLICATION, WITHOUT LONG-TERM CURRENT USE OF INSULIN (HCC): Primary | ICD-10-CM

## 2025-05-27 NOTE — TELEPHONE ENCOUNTER
Ashleigh is scheduled for an MRI with contrast.  By the time she has this done, her creatinine/GFR will be outside of the time allowed for an updated lab result.  Can you please enter an order.  Thanks!

## 2025-06-02 ENCOUNTER — HOSPITAL ENCOUNTER (OUTPATIENT)
Dept: MRI IMAGING | Age: 50
Discharge: HOME | End: 2025-06-04
Attending: OBSTETRICS & GYNECOLOGY
Payer: COMMERCIAL

## 2025-06-02 DIAGNOSIS — R93.89 ABNORMAL FINDING ON IMAGING: ICD-10-CM

## 2025-06-02 PROCEDURE — A9577 INJ MULTIHANCE: HCPCS | Performed by: OBSTETRICS & GYNECOLOGY

## 2025-06-02 PROCEDURE — 2709999900 MRI PELVIS W WO CONTRAST

## 2025-06-02 PROCEDURE — 6360000004 HC RX CONTRAST MEDICATION: Performed by: OBSTETRICS & GYNECOLOGY

## 2025-06-02 RX ADMIN — GADOBENATE DIMEGLUMINE 15 ML: 529 INJECTION, SOLUTION INTRAVENOUS at 13:41

## 2025-06-03 ENCOUNTER — RESULTS FOLLOW-UP (OUTPATIENT)
Dept: OBGYN CLINIC | Age: 50
End: 2025-06-03

## 2025-06-11 ENCOUNTER — OFFICE VISIT (OUTPATIENT)
Dept: OBGYN | Age: 50
End: 2025-06-11
Payer: COMMERCIAL

## 2025-06-11 VITALS
SYSTOLIC BLOOD PRESSURE: 130 MMHG | BODY MASS INDEX: 27.1 KG/M2 | DIASTOLIC BLOOD PRESSURE: 80 MMHG | HEART RATE: 88 BPM | OXYGEN SATURATION: 100 % | WEIGHT: 153 LBS

## 2025-06-11 DIAGNOSIS — R93.89 ABNORMAL FINDING ON IMAGING: Primary | ICD-10-CM

## 2025-06-11 DIAGNOSIS — Z17.0 MALIGNANT NEOPLASM OF UPPER-OUTER QUADRANT OF RIGHT BREAST IN FEMALE, ESTROGEN RECEPTOR POSITIVE (HCC): ICD-10-CM

## 2025-06-11 DIAGNOSIS — C50.411 MALIGNANT NEOPLASM OF UPPER-OUTER QUADRANT OF RIGHT BREAST IN FEMALE, ESTROGEN RECEPTOR POSITIVE (HCC): ICD-10-CM

## 2025-06-11 PROCEDURE — 99213 OFFICE O/P EST LOW 20 MIN: CPT | Performed by: OBSTETRICS & GYNECOLOGY

## 2025-06-11 NOTE — PROGRESS NOTES
History of breast cancer     FINDINGS:     Measurements:     Uterus: 10.68 x 5.33 x 6.22 cm.     Endometrial stripe: Not well delineated estimated at 10 mm.     Right Ovary:3.22 x 2.05 by 3.16 cm.     Left Ovary: 2.78 x 1.77 x 2.84 cm.        Ultrasound Findings:     Uterus: Uterus is anteverted and anteflexed.  Myometrium is slightly  heterogenous.  A left fundal fibroid of 4.3 x 3.4 x 3.3 cm is noted.     Endometrial stripe: Endometrial stripe is poorly demarcated.  Areas  heterogenous in there is a hypoechoic area which may represent a  subendometrial cyst or polyp.     Right Ovary: Right ovary is within normal limits.  Normal arterial and venous  color flow Doppler is noted.     Left Ovary:  Left ovary is within normal limits.  Small follicular size  cysts.  Normal arterial and venous color flow Doppler.     Free Fluid: No evidence of free fluid.     IMPRESSION:  1. Left fundal fibroid of 4.3 x 3.4 x 3.3 cm.  2. Endometrial stripe is poorly demarcated estimated at 10 mm. Areas  heterogenous in there is a hypoechoic area which may represent a  subendometrial cyst or polyp.  3. Normal appearance of the ovaries.     RECOMMENDATIONS:  Consider MRI imaging of the pelvis in this patient with personal history of  breast malignancy and metastasis.                  Lab Results:          No visits with results within 21 Day(s) from this visit.   Latest known visit with results is:   Orders Only on 04/10/2025   Component Date Value Ref Range Status    Hemoglobin A1C 04/09/2025 6.1  % Final    Estimated Avg Glucose 04/09/2025 128    Corrected   ]  GYN Cytology  Order: [id number]   Status: Final result       Next appt: Today at 05:20 PM in Obstetrics and Gynecology (Tha Mckeon, )    Test Result Released: Yes (seen)    0 Result Notes       1  Topic        Component  Ref Range & Units (hover) 8/28/24 0000   Cytology Report Path Number: [id number]    DIAGNOSIS    Imaged ThinPrep Pap - Cervical (1 monolayer

## 2025-08-26 LAB
ESTIMATED AVERAGE GLUCOSE: 137
HBA1C MFR BLD: 6.4 %

## 2025-08-27 DIAGNOSIS — E11.9 TYPE 2 DIABETES MELLITUS WITHOUT COMPLICATION, WITH LONG-TERM CURRENT USE OF INSULIN (HCC): ICD-10-CM

## 2025-08-27 DIAGNOSIS — Z79.4 TYPE 2 DIABETES MELLITUS WITHOUT COMPLICATION, WITH LONG-TERM CURRENT USE OF INSULIN (HCC): ICD-10-CM
